# Patient Record
Sex: FEMALE | Race: BLACK OR AFRICAN AMERICAN | Employment: OTHER | URBAN - METROPOLITAN AREA
[De-identification: names, ages, dates, MRNs, and addresses within clinical notes are randomized per-mention and may not be internally consistent; named-entity substitution may affect disease eponyms.]

---

## 2022-02-08 ENCOUNTER — OFFICE VISIT (OUTPATIENT)
Dept: ORTHOPEDIC SURGERY | Age: 47
End: 2022-02-08
Payer: OTHER GOVERNMENT

## 2022-02-08 VITALS
OXYGEN SATURATION: 100 % | HEIGHT: 67 IN | WEIGHT: 176 LBS | BODY MASS INDEX: 27.62 KG/M2 | HEART RATE: 74 BPM | TEMPERATURE: 97.8 F

## 2022-02-08 DIAGNOSIS — M22.2X2 PATELLOFEMORAL SYNDROME OF LEFT KNEE: ICD-10-CM

## 2022-02-08 DIAGNOSIS — S83.282A TEAR OF LATERAL MENISCUS OF LEFT KNEE, CURRENT, UNSPECIFIED TEAR TYPE, INITIAL ENCOUNTER: Primary | ICD-10-CM

## 2022-02-08 PROCEDURE — 99203 OFFICE O/P NEW LOW 30 MIN: CPT | Performed by: ORTHOPAEDIC SURGERY

## 2022-02-08 RX ORDER — SODIUM FLUORIDE 5 MG/ML
PASTE, DENTIFRICE DENTAL
COMMUNITY
Start: 2021-12-28

## 2022-02-08 RX ORDER — ROSUVASTATIN CALCIUM 20 MG/1
TABLET, COATED ORAL
COMMUNITY
Start: 2021-12-15

## 2022-02-08 RX ORDER — MELOXICAM 15 MG/1
15 TABLET ORAL DAILY
Qty: 30 TABLET | Refills: 1 | Status: SHIPPED | OUTPATIENT
Start: 2022-02-08

## 2022-02-08 RX ORDER — DICLOFENAC SODIUM 10 MG/G
GEL TOPICAL
COMMUNITY
Start: 2021-12-28

## 2022-02-08 RX ORDER — ERGOCALCIFEROL 1.25 MG/1
50000 CAPSULE ORAL
COMMUNITY

## 2022-02-08 NOTE — PROGRESS NOTES
Javier Darby  1975   Chief Complaint   Patient presents with    Knee Pain     left knee        HISTORY OF PRESENT ILLNESS  Javier Darby is a 41 y.o. female who presents today for evaluation of left knee. She rates her pain 6/10 today. Pain has been present for almost 20 years. She has been in the [employer] and has not gotten treatment in the past 10 years. She has pain with climbing stairs. She notes stiffness after staying in one position for too long. Patient describes the pain as aching and sharp that is Constant in nature. Symptoms are worse with Bending; Stretching; Straightening; Exercise and is better with  nothing. Associated symptoms include nothing. Since problem started, it: is unchanged. Pain does not wake patient up at night. Has taken no meds for the problem. Has tried following treatments: Injections:NO; Brace:NO; Therapy:YES; Cane/Crutch:NO       No Known Allergies     History reviewed. No pertinent past medical history. Social History     Socioeconomic History    Marital status:      Spouse name: Not on file    Number of children: Not on file    Years of education: Not on file    Highest education level: Not on file   Occupational History    Not on file   Tobacco Use    Smoking status: Never Smoker    Smokeless tobacco: Never Used   Substance and Sexual Activity    Alcohol use: Not on file    Drug use: Not on file    Sexual activity: Not on file   Other Topics Concern    Not on file   Social History Narrative    Not on file     Social Determinants of Health     Financial Resource Strain:     Difficulty of Paying Living Expenses: Not on file   Food Insecurity:     Worried About Running Out of Food in the Last Year: Not on file    [place] of Food in the Last Year: Not on file   Transportation Needs:     Lack of Transportation (Medical): Not on file    Lack of Transportation (Non-Medical):  Not on file   Physical Activity:     Days of Exercise per Week: Not on file   [place] of Exercise per Session: Not on file   Stress:     Feeling of Stress : Not on file   Social Connections:     Frequency of Communication with Friends and Family: Not on file    Frequency of Social Gatherings with Friends and Family: Not on file    Attends [employer] Services: Not on file    Active Member of [address] [place] [address] [employer] or Organizations: Not on file    Attends Club or Organization Meetings: Not on file    Marital Status: Not on file   Intimate Partner Violence:     Fear of Current or Ex-Partner: Not on file    Emotionally Abused: Not on file    Physically Abused: Not on file    Sexually Abused: Not on file   Housing Stability:     Unable to Pay for Housing in the Last Year: Not on file    Number of [place] in the Last Year: Not on file    Unstable Housing in the Last Year: Not on file      Past Surgical History:   Procedure Laterality Date    HX  SECTION      HX HYSTERECTOMY        History reviewed. No pertinent family history. Current Outpatient Medications   Medication Sig    rosuvastatin (CRESTOR) 20 mg tablet TAKE ONE-HALF TABLET BY MOUTH EVERY NIGHT AT BEDTIME -FOR CHOLESTEROL    PreviDent 5000 Plus 1.1 % crea     ergocalciferol (ERGOCALCIFEROL) 1,250 mcg (50,000 unit) capsule Take 50,000 Units by mouth.  diclofenac (VOLTAREN) 1 % gel     meloxicam (MOBIC) 15 mg tablet Take 1 Tablet by mouth daily. No current facility-administered medications for this visit. REVIEW OF SYSTEM   Patient denies: Weight loss, Fever/Chills, HA, Visual changes, Fatigue, Chest pain, SOB, Abdominal pain, N/V/D/C, Blood in stool or urine, Edema. Pertinent positive as above in HPI. All others were negative    PHYSICAL EXAM:   Visit Vitals  Pulse 74   Temp 97.8 °F (36.6 °C) (Temporal)   Ht 5' 7\" (1.702 m)   Wt 176 lb (79.8 kg)   SpO2 100%   BMI 27.57 kg/m²     The patient is a well-developed, well-nourished female   in no acute distress.   The patient is alert and oriented times three.  The patient is alert and oriented times three. Mood and affect are normal.  LYMPHATIC: lymph nodes are not enlarged and are within normal limits  SKIN: normal in color and non tender to palpation. There are no bruises or abrasions noted. NEUROLOGICAL: Motor sensory exam is within normal limits. Reflexes are equal bilaterally. There is normal sensation to pinprick and light touch  MUSCULOSKELETAL:  Examination Left knee   Skin Intact   Range of motion 0-130   Effusion -   Medial joint line tenderness +   Lateral joint line tenderness -   Tenderness Pes Bursa -   Tenderness insertion MCL -   Tenderness insertion LCL -   Shiras -   Patella crepitus -   Patella grind -   Lachman -   Pivot shift -   Anterior drawer -   Posterior drawer -   Varus stress -   Valgus stress -   Neurovascular Intact   Calf Swelling and Tenderness to Palpation -   Tara's Test -   Hamstring Cord Tightness -        PROCEDURE: none    IMAGING: MRI of left knee dated 1/17/2022 was reviewed and read by Dr. Tatum Ke:   1. Small free edge radial tear involving the midportion of the midbody of the lateral meniscus   2. Very mild edema in the fat interposed between the intact iliotibial band and the lateral femoral condyle can be seen with mild distal iliotibial band friction syndrome. IMPRESSION:      ICD-10-CM ICD-9-CM    1. Tear of lateral meniscus of left knee, current, unspecified tear type, initial encounter  S83.282A 836.1 REFERRAL TO PHYSICAL THERAPY      meloxicam (MOBIC) 15 mg tablet   2. Patellofemoral syndrome of left knee  M22.2X2 719.46 REFERRAL TO PHYSICAL THERAPY      meloxicam (MOBIC) 15 mg tablet        PLAN:  1. Patient presents today with a left knee lateral meniscus tear and patellofemoral joint syndrome. We will proceed with PT and mobic to help with inflammation. Return in 5 weeks. Risk factors include: n/a  2. No ultrasound exam indicated today  3. No cortisone injection indicated today   4.  Yes Physical/Occupational Therapy indicated today  5. No diagnostic test indicated today:   6. No durable medical equipment indicated today  7. No referral indicated today   8. Yes medications indicated today: MOBIC  9. No Narcotic indicated today     RTC 5 weeks       Scribed by Adriano Summers [facility]) as dictated by Zainab Santos MD    I, Dr. Zainab Santos, confirm that all documentation is accurate.     Zainab Santos M.D.   [facility]

## 2022-02-14 ENCOUNTER — HOSPITAL ENCOUNTER (OUTPATIENT)
Dept: PHYSICAL THERAPY | Age: 47
Discharge: HOME | End: 2022-02-14
Payer: OTHER GOVERNMENT

## 2022-02-14 PROCEDURE — 97530 THERAPEUTIC ACTIVITIES: CPT

## 2022-02-14 PROCEDURE — 97110 THERAPEUTIC EXERCISES: CPT

## 2022-02-14 PROCEDURE — 97161 PT EVAL LOW COMPLEX 20 MIN: CPT

## 2022-02-14 NOTE — PROGRESS NOTES
PT DAILY TREATMENT NOTE/Knee Evaluation    Patient Name: Michael Major  Date:2022  : 1975  [x]  Patient  Verified  Payor: YAMINI / Plan: Samm Baldwin 76 / Product Type:  /    In time:10:04  Out time:10:49  Total Treatment Time (min): 45  Total Timed Codes (min): 23  1:1 Treatment Time ( [address] only): 23   Visit #: 1 of 16    Treatment Area: Left knee pain [M25.562]    SUBJECTIVE  Any medication changes, allergies to medications, adverse drug reactions, diagnosis change, or new procedure performed?: [x] No    [] Yes (see summary sheet for update)  Hx Present Illness: Subjective: Pt reports she started having pain [id number] while at work a chair was pushed into the back of her knees and fell and since then has had pain. 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 Reports would have intermittent pain throughout and sticking sensation since then. Reports feels like the knee would give out at any time. Reports that she is finally getting to a doctor. Reports that they did an MRI on 22 and determined torn mensicus so referred to PT. Doctor gave Meloxicam with no relief.     Associated symptoms:  denies    Pain:  _10__/10 max (in the last 48 hrs) _10__/10 min (in the last 48 hrs) __10_/10 currently at rest    Location: medial left knee     [x] Sharp    [] Dull      [] Burning     []  Aching     [x] Throbbing      [] Tingling     [] Other:       [x]  Constant                   [] Intermittent      Increases Pain: going up and down stairs, getting up from sitting, walking long distance, squatting  Decreases Pain:  nothing  Since Onset:  No change     Functional Limitations:  Stairs, sleeping, squatting to pick objects off the ground    Previous treatment:  PT 10 yrs ago for the knee, Cortizone injection    Co-morbidities: PMHx/Surgical Hx: []DM [] HTN (controlled) [x] High cholesterol (controlled) [] Cancer [x]Other back pain, hip pain, knee pain, bilateral plantar fascitis, neck pain  Prior Level of Function:  functionally independent, no AD, pain in the knee  Social/Recreation/Work: Work Hx: retired  Recreational Activities: raising two small kids-h[employer]     Diagnostic Testing:  [] Lab work [] X-rays    [] CT [x] MRI     [] Other:  Results: MRI per documentation: 1.  Small free radial edge tear involving the midportion of the midbody of the lateral mensicus; please see report for full details    Patient Goal(s): \"to be able to walk up the stairs with less pain\"    Barriers: [x] none []pain []financial []time []transportation []other  Motivation: good  Substance use:[x] none  []Alcohol []Tobacco []other:   Cognition: A & O x 3    Objective:    Pt enters gym in no apparent distress    Posture: [] Poor    [x] Fair    [] Good    Describe: right>left toe out, increased lumbar lordosis, left iliac crest higher, right dec longitudinal arch    Outcome measure: FOTO  Score=77    Movement/gait:  [] Normal     [x] Abnormal:  Toe out, wide base of support, dec ankle DF at heel strike, dec toe off    Patellar Positioning (Static)   []Left []Right Normal [x]Left [x]Right Lateral   [x]Left [x]Right Dominic Cody      []Left []Right Medial   []Left []Right [place]      Edema:   (centimeters) Right Left   2\" above mid patella     Mid patella 39.1 40.6   2\" below mid patella         Palpation: TTP:  Left: tibial tuberosity (caused pain to radiate), patellar tendon, lateral joint line, lateral femoral condyle, medial patella, medial joint line, medial femoral condyle, medial tibial condyle, increased tonicity in the quad    Patellar Mobility: []Left  []Right Hypermobile [x]Left [x]Right Hypomobile                           Hip Alignment: supine: right ASIS higher        ROM:  [] Unable to assess at this time     AROM             Knee Left Right   Extension Lacking 2  0   Flexion 142 (pain at end range and pain with returning to neutral) 141      LTR: right: 18 increases; Left: 18.5 inches     AROM                       Hip Left Right   Flexion 75 82                                        AROM      Ankle Left Right   Dorsiflexion 4 (pain in the knee) 0   Plantarflexion       Strength:   [] Unable to assess at this time    Left (0-5) Right (0-5) N/T   Knee extension 5- 5 []   Knee flexion 5 5 []   Ankle Dorsiflexion (L4) 4+ 4+ []   Ankle Plantarflexion (S1)  #heel raises   [x]   Hip flexion 5- 5- []   Hip abduction 4 4 []   Hip ADDuction   [x]   Hip ER 4- 4 []   Hip IR 4- 4 []   Hip extension 3+ 4- []   Glute Max 3+ 3+ []   Glute Med 4- 4- []              Balance: SLS: Left: more unsteady,left toe out, inc pronation, inc tibial rotation, 30\" feel it more than felt the right Right:  Right toe out, increased pronation, 30\"    Squat: Wide base of support, no lumbar flexion, anterior tibial translation, heel lift    Single leg squat: N/a    Neuro Screen [x] WNL Intact to light touch in bilateral LE.   Myotome/Dermatome/Reflexes:  Comments:    Special Test:  Knee  Left Right   Varus Stress Test neg neg   Valgus Stress Test Positive for pain on the medial knee, slight laxity neg   Lachman's     Apprehension     Shira's Positive for pain neg   Apley's test positive neg   Thessaly     Posterior Sag     Patellar Grind     Single Leg Squat     Anterior Drawer     Single leg sit to stand     Yosvany test ([employer])     Noble Compression test ([employer])       Hip Left Right   Dinesh Test     Aleyda Dumont     90-90 40 40   Leg length: supine: Right appears shorter    22 min [x]Eval                  []Re-Eval       15 min Therapeutic Exercise:  [x] See flow sheet :   Rationale: increase ROM and increase strength to improve the patients ability to perform daily activities with decreased pain and symptom levels     8 min Therapeutic Activity:  [x]  See flow sheet :   Rationale: assess ROM and assess strength  to improve the patients ability to perform daily activities with decreased pain and symptom levels              With   [x] TE   [x] TA   [] neuro   [] other: Patient Education: [x] Review HEP    [] Progressed/Changed HEP based on:   [x] positioning   [x] body mechanics   [] transfers   [] heat/ice application    [x] other:  Posture, anatomy, pelvic position, proper form with squatting       Pain Level (0-10 scale) post treatment: 10/10    Assessment/ key information:  Marcia Saldana is a 41 y.o.  yo female who presents to [facility] diagnosed with patellofemoral disorder of left knee. Patient is s/p fall in 1994 with inc pain in 2003. Pt has had intermittent pain and locking since then, but just now getting treatment. Per MRI reports pt with left lateral meniscal tear, but pt having constant sharp/throbbing pain at the medial left knee that increases with getting up from sitting, standing, laying down, going up and down stairs, and squatting that limites her ability to perform stairs, sleep, and squat to pick objects off the ground. Pt reporting nothing helps relief the pain. Pt rates pain 10/10 currently and 10/10 at worst, and 10/10 at least. Patient demonstrates decreased ROM, decreased strength, impaired posture, increased swelling, impaired balance, and decreased functional mobility tolerance. Pt is tender to palpate and has muscle hypertonicity along the left tibial tuberosity (caused pain to radiate to the medial knee), patellar tendon, lateral joint line, lateral femoral condyle, medial patella, medial joint line, medial femoral condyle, medial tibial condyle, increased tonicity in the quad. Pt also presents with impaired squatting techniques. Pt with positive special test including Shira's, Apley's compression, as well as Valgus stress test. Pt with difficult with performing posterior pelvic tilt as well as pelvic malalignment, which can contribute to symptoms with lumbo-pelvic dysfunction.  Pt signs and symptoms are consistent with diagnosis and mechanical knee pain. Pt would benefit from skilled physical therapy to address these limitations. Patient will benefit from skilled PT services to modify and progress therapeutic interventions, address functional mobility deficits, address ROM deficits, address strength deficits, analyze and address soft tissue restrictions, analyze and cue movement patterns, analyze and modify body mechanics/ergonomics, assess and modify postural abnormalities, and address imbalance/dizziness to attain remaining goals. [x]  See Plan of Care  []  See progress note/recertification  []  See Discharge Summary    Evaluation Complexity History HIGH Complexity :3+ comorbidities / personal factors will impact the outcome/ POC ; Examination HIGH Complexity : 4+ Standardized tests and measures addressing body structure, function, activity limitation and / or participation in recreation  ;Presentation MEDIUM Complexity : Evolving with changing characteristics  ; Clinical Decision Making LOW Complexity : FOTO score of  FOTO score = an established functional score where 100 = no disability  Overall Complexity Rating: LOW   Problem List: pain affecting function, decrease ROM, decrease strength, edema affecting function, impaired gait/ balance, decrease ADL/ functional abilitiies, decrease activity tolerance, decrease flexibility/ joint mobility and decrease transfer abilities     Treatment Plan may include any combination of the following: Therapeutic exercise, Therapeutic activities, Neuromuscular re-education, Physical agent/modality, Gait/balance training, Manual therapy, Patient education, Self Care training, Functional mobility training, Home safety training and Stair training  Vasopnuematic compression justification:  Per bilateral girth measures taken and listed above the edema is considered significant and having an impact on the patient's strength, balance, gait, transfers, self care and ADLs  Patient / Family readiness to learn indicated by: asking questions, trying to perform skills and interest  Persons(s) to be included in education: patient (P)  Barriers to Learning/Limitations: None  Patient Goal (s): \"to be able to walk up the stairs with less pain\"  Patient Self Reported Health Status: excellent  Rehabilitation Potential: good    Short Term Goals: To be accomplished in 2 weeks: 1. Patient will report compliance with HEP 1x/day to aid in rehabilitation program.  Status at IE: Provided pt with HEP and pt appeared to understand. Current:Same as IE     2. Patient will rate pain on greater than 8/10 so pt can perform ADL's. Status at IE:pt rates worst pain 10/10, least pain 10/10, current pain 10/10  Current: Same as IE    Long Term Goals: To be accomplished in 8 weeks: 1. Patient will increase limited Bilateral LE strength by at least . 5 grade MMT throughout so pt can perform stairs. Status at IE:   Left (0-5) Right (0-5) N/T   Knee extension 5- 5 []   Knee flexion 5 5 []   Ankle Dorsiflexion (L4) 4+ 4+ []   Ankle Plantarflexion (S1)  #heel raises   [x]   Hip flexion 5- 5- []   Hip abduction 4 4 []   Hip ADDuction   [x]   Hip ER 4- 4 []   Hip IR 4- 4 []   Hip extension 3+ 4- []   Glute Max 3+ 3+ []   Glute Med 4- 4- []     Current: Same as IE    2. Patient will report pain no greater than 6/10 throughout entire day to aid in completion of ADLs. Status at IE:see STG  Current: Same as IE    3. Patient will have pain free full knee flexion; increase limited ankle and hip ROM by 5 degrees so pt can  objects off the ground. Status at IE:            Knee Left Right   Extension Lacking 2  0   Flexion 142 (pain at end range and pain with returning to neutral) 141                    Hip Left Right   Flexion 75 82                          Ankle Left Right   Dorsiflexion 4 (pain in the knee) 0   Current: Same as IE    4. Patient will improve FOTO score to 79 points to demonstrate improvement in functional status.   Status at IE:77  Current: Same as IE    *FOTO score is an established functional score where 100 = no disability*    5. Pt will have 2 inch improvement in LTR so pt can have the mobility in the lumbar spine to perform a proper squat to be able to pick objects off the ground. Status at IE: LTR: right: 18 increases; Left: 18.5 inches   Current: Same as IE    6. Pt will perform proper squat with femurs close to parallel to the ground, minimal anterior tibial translation, weight posteriorly onto heels, no heel lift, no genu valgus, good posterior pelvic tilt. Status at IE: Wide base of support, no lumbar flexion, anterior tibial translation, heel lift  Current: Same as IE    Frequency / Duration: Patient to be seen 2 times per week for 8  weeks.     PLAN  [x]  Upgrade activities as tolerated     []  Continue plan of care  [x]  Update interventions per flow sheet       []  Discharge due to:_  []  Other:_        Roman Jay, PT, DPT, [facility] 2/14/2022  9:54 AM

## 2022-02-14 NOTE — PROGRESS NOTES
[facility] at the [address] [place] Sharyn Boop Amanda rowe, [address] [facility]  Phone: [phone]      Fax:  [phone]             Plan of Care/ Statement of Necessity for Physical Therapy Services      Patient name: Gladys Presley Start of Care: 2022   Referral source: Alejandro Stone,* : 1975    Medical Diagnosis: Left knee pain [M25.562]   Onset FEWH:1212, inc pain     Treatment Diagnosis: left knee pain   Prior Hospitalization: see medical history Provider#: [id number]   Medications: Verified on Patient summary List    Co-morbidities: PMHx/Surgical Hx: []?DM []? HTN (controlled) [x]? High cholesterol (controlled) []? Cancer [x]? Other back pain, hip pain, knee pain, bilateral plantar fascitis, neck pain  Prior Level of Function:  functionally independent, no AD, pain in the knee  Social/Recreation/Work: Work Hx: retired  Recreational Activities: raising two small kids-[employer]       The Plan of Care and following information is based on the information from the initial evaluation. Assessment/ pompa information:    Gladys Presley is a 41 y.o.  yo female who [facility] to [facility] diagnosed with patellofemoral disorder of left knee. Patient is s/p fall in  with inc pain in . Pt has had intermittent pain and locking since then, but just now getting treatment. Per MRI reports pt with left lateral meniscal tear, but pt having constant sharp/throbbing pain at the medial left knee that increases with getting up from sitting, standing, laying down, going up and down stairs, and squatting that limites her ability to perform stairs, sleep, and squat to pick objects off the ground. Pt reporting nothing helps relief the pain. Pt rates pain 10/10 currently and 10/10 at worst, and 10/10 at least. Patient demonstrates decreased ROM, decreased strength, impaired posture, increased swelling, impaired balance, and decreased functional mobility tolerance.  Pt is tender to palpate and has muscle hypertonicity along the left tibial tuberosity (caused pain to radiate to the medial knee), patellar tendon, lateral joint line, lateral femoral condyle, medial patella, medial joint line, medial femoral condyle, medial tibial condyle, increased tonicity in the quad. Pt also presents with impaired squatting techniques. Pt with positive special test including Shira's, Apley's compression, as well as Valgus stress test. Pt with difficult with performing posterior pelvic tilt as well as pelvic malalignment, which can contribute to symptoms with lumbo-pelvic dysfunction. Pt signs and symptoms are consistent with diagnosis and mechanical knee pain.  Pt would benefit from skilled physical therapy to address these limitations.     Patient will benefit from skilled PT services to modify and progress therapeutic interventions, address functional mobility deficits, address ROM deficits, address strength deficits, analyze and address soft tissue restrictions, analyze and cue movement patterns, analyze and modify body mechanics/ergonomics, assess and modify postural abnormalities, and address imbalance/dizziness to attain remaining goals.          Evaluation Complexity History HIGH Complexity :3+ comorbidities / personal factors will impact the outcome/ POC ; Examination HIGH Complexity : 4+ Standardized tests and measures addressing body structure, function, activity limitation and / or participation in recreation  ;Presentation MEDIUM Complexity : Evolving with changing characteristics  ;Clinical Decision Making LOW Complexity : FOTO score of  FOTO score = an established functional score where 100 = no disability  Overall Complexity Rating: LOW   Problem List: pain affecting function, decrease ROM, decrease strength, edema affecting function, impaired gait/ balance, decrease ADL/ functional abilitiies, decrease activity tolerance, decrease flexibility/ joint mobility and decrease transfer [address] may include any combination of the following: Therapeutic exercise, Therapeutic activities, Neuromuscular re-education, Physical agent/modality, Gait/balance training, Manual therapy, Patient education, Self Care training, Functional mobility training, Home safety training and Stair training  Vasopnuematic compression justification:  Per bilateral girth measures taken and listed above the edema is considered significant and having an impact on the patient's strength, balance, gait, transfers, self care and ADLs  Patient / Family readiness to learn indicated by: asking questions, trying to perform skills and interest  Persons(s) to be included in education: patient (P)  Barriers to Learning/Limitations: None  Patient Goal (s): \"to be able to walk up the stairs with less pain\"  Patient Self Reported Health Status: excellent  Rehabilitation Potential: good     Short Term Goals: To be accomplished in 2 weeks: 1. Patient will report compliance with HEP 1x/day to aid in rehabilitation program.  Status at IE: Provided pt with HEP and pt appeared to understand. Current:Same as IE     2. Patient will rate pain on greater than 8/10 so pt can perform ADL's. Status at IE:pt rates worst pain 10/10, least pain 10/10, current pain 10/10  Current: Same as IE     Long Term Goals: To be accomplished in 8 weeks: 1. Patient will increase limited Bilateral LE strength by at least . 5 grade MMT throughout so pt can perform stairs.   Status at IE:    Left (0-5) Right (0-5) N/T   Knee extension 5- 5 []?    Knee flexion 5 5 []?    Ankle Dorsiflexion (L4) 4+ 4+ []?    Ankle Plantarflexion (S1)  #heel raises     [x]?    Hip flexion 5- 5- []?    Hip abduction 4 4 []?    Hip ADDuction     [x]?    Hip ER 4- 4 []?    Hip IR 4- 4 []?    Hip extension 3+ 4- []?    Glute Max 3+ 3+ []?    Glute Med 4- 4- []?       Current: Same as IE     2.Patient will report pain no greater than 6/10 throughout entire day to aid in completion of ADLs. Status at IE:see STG  Current: Same as IE     3. Patient will have pain free full knee flexion; increase limited ankle and hip ROM by 5 degrees so pt can  objects off the ground. Status at IE:              Knee Left Right   Extension Lacking 2  0   Flexion 142 (pain at end range and pain with returning to neutral) 141                    Hip Left Right   Flexion 75 82                                                     Ankle Left Right   Dorsiflexion 4 (pain in the knee) 0   Current: Same as IE     4.Patient will improve FOTO score to 79 points to demonstrate improvement in functional status. Status at IE:77  Current: Same as IE     *FOTO score is an established functional score where 100 = no disability*     5.Pt will have 2 inch improvement in LTR so pt can have the mobility in the lumbar spine to perform a proper squat to be able to pick objects off the ground. Status at IE: LTR: right: 18 increases; Left: 18.5 inches   Current: Same as IE     6. Pt will perform proper squat with femurs close to parallel to the ground, minimal anterior tibial translation, weight posteriorly onto heels, no heel lift, no genu valgus, good posterior pelvic tilt. Status at IE: Wide base of support, no lumbar flexion, anterior tibial translation, heel lift  Current: Same as IE     Frequency / Duration: Patient to be seen 2 times per week for 8  weeks. Patient/ Caregiver education and instruction: Diagnosis, prognosis, self care, activity modification and exercises Posture, anatomy, pelvic position, proper form with squatting   [x]  Plan of care has been reviewed with PTA    Certification Period: n/a  Sara Thayer, PT, DPT, [facility] 2/14/2022 9:56 AM  _____________________________________________________________________  I certify that the above Therapy Services are being furnished while the patient is under my care.  I agree with the treatment plan and certify that this therapy is necessary.     Physician's Signature:____________Date:_________TIME:________                                      Santhosh Rosen,*    ** Signature, Date and Time must be completed for valid certification **    Please sign and return to [facility] at the [address], [address] [facility]       Phone: [phone]      Fax:  [phone]

## 2022-02-16 ENCOUNTER — HOSPITAL ENCOUNTER (OUTPATIENT)
Dept: PHYSICAL THERAPY | Age: 47
Discharge: HOME | End: 2022-02-16
Payer: OTHER GOVERNMENT

## 2022-02-16 PROCEDURE — 97530 THERAPEUTIC ACTIVITIES: CPT

## 2022-02-16 PROCEDURE — 97110 THERAPEUTIC EXERCISES: CPT

## 2022-02-16 PROCEDURE — 97016 VASOPNEUMATIC DEVICE THERAPY: CPT

## 2022-02-16 PROCEDURE — 97140 MANUAL THERAPY 1/> REGIONS: CPT

## 2022-02-16 PROCEDURE — 97112 NEUROMUSCULAR REEDUCATION: CPT

## 2022-02-16 NOTE — PROGRESS NOTES
PT DAILY TREATMENT NOTE    Patient Name: Fortino Delacruz  Date:2022  : 1975  [x]  Patient  Verified  Payor:  / Plan: [facility]  [facility] REGION / Product Type:  /    In time:12:47  Out time:1:49  Total Treatment Time (min): 62  Total Timed Codes (min): 52  1:1 Treatment Time (MC/BCBS only): 46   Visit #: 2 of 16    Treatment Dx: Left knee pain [M25.562]    SUBJECTIVE  Pain Level (0-10 scale): 10/10  Any medication changes, allergies to medications, adverse drug reactions, diagnosis change, or new procedure performed?: [x] No    [] Yes (see summary sheet for update)  Subjective functional status/changes:   [] No changes reported  Pt reports that she had some throbbing after the evaluation and has been that way since. Reports that she did the HEP last night.      OBJECTIVE    Modalities Rationale:     decrease edema, decrease inflammation and decrease pain to improve patient's ability to perform daily activities with decreased pain and symptom levels     min [] Estim, type/location:                                      []  att     []  unatt     []  w/US     []  w/ice    []  w/heat    min []  Mechanical Traction: type/lbs                   []  pro   []  sup   []  int   []  cont    []  before manual    []  after manual    min []  Ultrasound, settings/location:      min []  Iontophoresis w/ dexamethasone, location:                                               []  take home patch       []  in clinic    min []  Ice     []  Heat    location/position:    10 min [x]  Vasopneumatic Device, press/temp: Left knee, medium pressure, LE elevated   If using vaso (only need to measure limb vaso being performed on)      pre-treatment girth : 40. cm      post-treatment girth : 39      measured at (landmark location) :  Mid patella    min []  Other:    [] Skin assessment post-treatment (if applicable):    []  intact    []  redness- no adverse reaction                  []redness - adverse reaction: 19 min Therapeutic Exercise:  [] See flow sheet :   Rationale: increase ROM, increase strength, improve coordination, improve balance and increase proprioception to improve the patients ability to perform daily activities with decreased pain and symptom levels      10 min Therapeutic Activity:  []  See flow sheet :   Rationale: increase ROM, increase strength, improve coordination, improve balance and increase proprioception  to improve the patients ability to perform daily activities with decreased pain and symptom levels       15 min Neuromuscular Re-education:  []  See flow sheet :   Rationale: increase ROM, increase strength, improve coordination, improve balance and increase proprioception  to improve the patients ability to perform daily activities with decreased pain and symptom levels      8 min Manual Therapy:  Pt in left sidelying with bolster under left trunk, right LE extended with peanut bolster under leg, MFR superficial to Right QL with right iliac depression, STM to Right QL, manual posterior capsule stretch     Rationale: decrease pain, increase ROM, increase tissue extensibility, decrease trigger points and increase postural awareness to improve the patients ability to perform daily activities with decreased pain and symptom levels    The manual therapy interventions were performed at a separate and distinct time from the therapeutic activities interventions. With   [x] TE   [x] TA   [x] neuro   [] other: Patient Education: [x] Review HEP    [] Progressed/Changed HEP based on:   [x] positioning   [] body mechanics   [] transfers   [] heat/ice application    [x] other: provided ADL handout, educated sleeping with pillow under trunk she is laying on, causes of meniscal tears,       Other Objective/Functional Measures: Pt enters gym in no apparent distress.        Pain Level (0-10 scale) post treatment: 10/10    ASSESSMENT/Changes in Function: Patient tolerated therapy session well as there were no adverse reactions today. Pt reporting high pain rating, but able to participate with therapy. Pt had a real hard time with left adductor pull back and required nearly constant tactile cuing for proper positioning. Pt noted to have decreased mobility in the left posterior hip. Pt feeling fatigued with glut max exercise. Pt reporting having some tingling in the right foot with lady in glasses. Pt also reporting feeling right neck discomfort with manual stretch of the left posterior capsule. Pt is progressing with therapy as indicated by pt tolerating increase in exercise repetitions and resistance. Although showing progress patient would benefit from continuation of skilled physical therapy to address the remaining limitations. Patient will continue to benefit from skilled PT services to modify and progress therapeutic interventions, address functional mobility deficits, address ROM deficits, address strength deficits, analyze and address soft tissue restrictions, analyze and cue movement patterns, analyze and modify body mechanics/ergonomics, assess and modify postural abnormalities, address imbalance/dizziness and instruct in home and community integration to attain remaining goals. [x]  See Plan of Care  []  See progress note/recertification  []  See Discharge Summary         Progress towards goals / Updated goals:  Short Term Goals:   To be accomplished in 2 weeks: 1. Patient will report compliance with HEP 1x/day to aid in rehabilitation program.  Status at IE: Provided pt with HEP and pt appeared to understand. Current:2/16/22 reviewed, provided ADL positioning handout     2. Patient will rate pain on greater than 8/10 so pt can perform ADL's. Status at IE:pt rates worst pain 10/10, least pain 10/10, current pain 10/10  Current: 2/16/22 rates pain 10/10 entering therapy, 10/10 post therapy     Long Term Goals: To be accomplished in 8 weeks: 1. Patient will increase limited Bilateral LE strength by at least . 5 grade MMT throughout so pt can perform stairs. Status at IE:    Left (0-5) Right (0-5) N/T   Knee extension 5- 5 []? ?    Knee flexion 5 5 []? ?    Ankle Dorsiflexion (L4) 4+ 4+ []? ?    Ankle Plantarflexion (S1)  #heel raises     [x]? ?    Hip flexion 5- 5- []??    Hip abduction 4 4 []? ?    Hip ADDuction     [x]? ?    Hip ER 4- 4 []? ?    Hip IR 4- 4 []? ?    Hip extension 3+ 4- []??    Glute Max 3+ 3+ []? ?    Glute Med 4- 4- []??       Current: Same as IE     2.Patient will report pain no greater than 6/10 throughout entire day to aid in completion of ADLs. Status at IE:see STG  Current: Same as IE     3. Patient will have pain free full knee flexion; increase limited ankle and hip ROM by 5 degrees so pt can  objects off the ground. Status at IE:              Knee Left Right   Extension Lacking 2  0   Flexion 142 (pain at end range and pain with returning to neutral) 141                    Hip Left Right   Flexion 75 82                                                     Ankle Left Right   Dorsiflexion 4 (pain in the knee) 0   Current: Same as IE     4.Patient will improve FOTO score to 79 points to demonstrate improvement in functional status. Status at IE:77  Current: Same as IE     *FOTO score is an established functional score where 100 = no disability*     5.Pt will have 2 inch improvement in LTR so pt can have the mobility in the lumbar spine to perform a proper squat to be able to pick objects off the ground. Status at IE: LTR: right: 18 increases; Left: 18.5 inches   Current: Same as IE     6. Pt will perform proper squat with femurs close to parallel to the ground, minimal anterior tibial translation, weight posteriorly onto heels, no heel lift, no genu valgus, good posterior pelvic tilt.   Status at IE: Wide base of support, no lumbar flexion, anterior tibial translation, heel lift  Current: Same as IE         PLAN  [x]  Upgrade activities as tolerated     [x] Continue plan of care  []  Update interventions per flow sheet       []  Discharge due to:_  []  Other:_      Luís Santana, PT, DPT, [facility] 2/16/2022  12:51 PM    Future Appointments   Date Time Provider Amanda Al   2/22/2022  2:15 PM Tanya Rankin [facility]   2/25/2022 12:15 PM Vaishali Roa PT [facility]   3/2/2022 12:45 PM Sher Celeste, PT [facility]   3/4/2022 12:45 PM Sher Celeste, PT [facility]   3/8/2022 12:45 PM Sher Celeste, PT [facility]   3/11/2022 12:45 PM Sher Celeste, PT [facility]

## 2022-02-22 ENCOUNTER — HOSPITAL ENCOUNTER (OUTPATIENT)
Dept: PHYSICAL THERAPY | Age: 47
Discharge: HOME | End: 2022-02-22
Payer: OTHER GOVERNMENT

## 2022-02-22 PROCEDURE — 97530 THERAPEUTIC ACTIVITIES: CPT

## 2022-02-22 PROCEDURE — 97110 THERAPEUTIC EXERCISES: CPT

## 2022-02-22 PROCEDURE — 97112 NEUROMUSCULAR REEDUCATION: CPT

## 2022-02-22 NOTE — PROGRESS NOTES
PT DAILY TREATMENT NOTE    Patient Name: Heyward Nyhan  Date:2022  : 1975  [x]  Patient  Verified  Payor:  [facility]  [facility] REGION / Product Type: Chantell Satinder /    In time:2:20  Out time:3:05  Total Treatment Time (min): 45  Total Timed Codes (min): 45  1:1 Treatment Time (MC/BCBS only): 45   Visit #: 3 of 16    Treatment Dx: Left knee pain [M25.562]    SUBJECTIVE  Pain Level (0-10 scale):  10  Any medication changes, allergies to medications, adverse drug reactions, diagnosis change, or new procedure performed?: [x] No    [] Yes (see summary sheet for update)  Subjective functional status/changes:   [] No changes reported  \"No change. \"    OBJECTIVE      10 min Therapeutic Exercise:  [x] See flow sheet :   Rationale: increase ROM and increase strength to improve the patients ability to perform daily activities with decreased pain and symptom levels    10 min Therapeutic Activity:  [x]  See flow sheet :   Rationale: increase strength, improve coordination and increase proprioception  to improve the patients ability to perform daily activities with decreased pain and symptom levels     25 min Neuromuscular Re-education:  [x]  See flow sheet :   Rationale: increase strength, improve coordination, improve balance and increase proprioception  to improve the patients ability to perform daily activities with decreased pain and symptom levels       With   [] TE   [] TA   [] neuro   [] other: Patient Education: [x] Review HEP    [] Progressed/Changed HEP based on:   [] positioning   [] body mechanics   [] transfers   [] heat/ice application    [] other:      Other Objective/Functional Measures:   WILLIAN Special Tests (pre/post)    Hip Add Drop Test:                           Right: + not midline/midline            Left:+ not midline/midline  Trunk Rot                                           Right: 18in/16in    Left 17in /16in   + SLR bilaterally left moves more than right  Hip extension positive bilaterally   Patella tracking laterally  Left HS fatigue with 90/90 hip shift  Max assist manually and with props to complete add pull back       Pain Level (0-10 scale) post treatment: 10    ASSESSMENT/Changes in Function: Pt tolerated session well with reporting no change in pain post session. Pt reporting still constant 10/10 pain with no relief in medial aspect of knee and reporting knee \"popping\" over weekend. Pt continues to demonstrate lumbopelvic dysfunction with noting lumbar and lateral quad compensation with exercises. Pt very challenged with PPT form however improved with completing QP first. Able to recruit left HS with 90/90 hip shift with minimal cues. Pt needing max assist with PPT to decreased lumbar compensation with left add pull back. Good response to standing post hip stretch and long sit reach with updating HEP today. Patient will continue to benefit from skilled PT services to modify and progress therapeutic interventions, address functional mobility deficits, address ROM deficits, address strength deficits, analyze and address soft tissue restrictions, analyze and cue movement patterns, analyze and modify body mechanics/ergonomics, assess and modify postural abnormalities, address imbalance/dizziness and instruct in home and community integration to attain remaining goals. [x]  See Plan of Care  []  See progress note/recertification  []  See Discharge Summary         Progress towards goals / Updated goals:  Short Term Goals:   To be accomplished in 2 weeks: 1. Patient will report compliance with HEP 1x/day to aid in rehabilitation program.  Status at IE: Provided pt with HEP and pt appeared to understand. Current:compliance, overview and updated today progressing 2/22/22     2. Patient will rate pain on greater than 8/10 so pt can perform ADL's.   Status at IE:pt rates worst pain 10/10, least pain 10/10, current pain 10/10  Current: 2/16/22 rates pain 10/10 entering therapy, 10/10 post therapy     Long Term Goals: To be accomplished in 8 weeks: 1. Patient will increase limited Bilateral LE strength by at least . 5 grade MMT throughout so pt can perform stairs. Status at IE:    Left (0-5) Right (0-5) N/T   Knee extension 5- 5 []? ??    Knee flexion 5 5 []? ??    Ankle Dorsiflexion (L4) 4+ 4+ []???    Ankle Plantarflexion (S1)  #heel raises     [x]? ??    Hip flexion 5- 5- []???    Hip abduction 4 4 []? ??    Hip ADDuction     [x]? ??    Hip ER 4- 4 []? ??    Hip IR 4- 4 []? ??    Hip extension 3+ 4- []???    Glute Max 3+ 3+ []???    Glute Med 4- 4- []???       Current: Same as IE     2.Patient will report pain no greater than 6/10 throughout entire day to aid in completion of ADLs. Status at IE:see STG  Current: Same as IE     3. Patient will have pain free full knee flexion; increase limited ankle and hip ROM by 5 degrees so pt can  objects off the ground. Status at IE:              Knee Left Right   Extension Lacking 2  0   Flexion 142 (pain at end range and pain with returning to neutral) 141                    Hip Left Right   Flexion 75 82                                                     Ankle Left Right   Dorsiflexion 4 (pain in the knee) 0   Current: Same as IE     4.Patient will improve FOTO score to 79 points to demonstrate improvement in functional status. Status at IE:77  Current: Same as IE     *FOTO score is an established functional score where 100 = no disability*     5.Pt will have 2 inch improvement in LTR so pt can have the mobility in the lumbar spine to perform a proper squat to be able to pick objects off the ground. Status at IE: LTR: right: 18 increases; Left: 18.5 inches   Current: 16in post session progressing 2/22/22     6. Pt will perform proper squat with femurs close to parallel to the ground, minimal anterior tibial translation, weight posteriorly onto heels, no heel lift, no genu valgus, good posterior pelvic tilt.   Status at IE: Wide base of support, no lumbar flexion, anterior tibial translation, heel lift  Current: Same as IE         PLAN  []  Upgrade activities as tolerated     [x]  Continue plan of care  []  Update interventions per flow sheet       []  Discharge due to:_  []  Other:_      Lennymolly Rankin 2/22/2022  1:27 PM    Future Appointments   Date Time Provider Amanda Al   2/22/2022  2:15 PM Lenny Rankin [facility]   2/25/2022 12:15 PM Palmer Hartmann, PT [facility]   3/2/2022 12:45 PM Gómez David, PT [facility]   3/4/2022 12:45 PM Gómez Joann, PT [facility]   3/8/2022 12:45 PM Gómez David, PT [facility]   3/11/2022 12:45 PM Gómez Joann, PT [facility]

## 2022-02-25 ENCOUNTER — APPOINTMENT (OUTPATIENT)
Dept: PHYSICAL THERAPY | Age: 47
End: 2022-02-25
Payer: OTHER GOVERNMENT

## 2022-03-02 ENCOUNTER — HOSPITAL ENCOUNTER (OUTPATIENT)
Dept: PHYSICAL THERAPY | Age: 47
Discharge: HOME | End: 2022-03-02
Payer: OTHER GOVERNMENT

## 2022-03-02 PROCEDURE — 97110 THERAPEUTIC EXERCISES: CPT

## 2022-03-02 PROCEDURE — 97530 THERAPEUTIC ACTIVITIES: CPT

## 2022-03-02 PROCEDURE — 97112 NEUROMUSCULAR REEDUCATION: CPT

## 2022-03-02 NOTE — PROGRESS NOTES
PT DAILY TREATMENT NOTE    Patient Name: Indu Juan  Date:3/2/2022  : 1975  [x]  Patient  Verified  Payor:  / Plan: [facility]  [facility], [place] [facility] / Product Type:  /    In time:12:46  Out time:1:41  Total Treatment Time (min): 55  Total Timed Codes (min): 55  1:1 Treatment Time (MC/BCBS only): 54   Visit #: 4 of 16    Treatment Dx: Left knee pain [M25.562]    SUBJECTIVE  Pain Level (0-10 scale): 8/10  Any medication changes, allergies to medications, adverse drug reactions, diagnosis change, or new procedure performed?: [x] No    [] Yes (see summary sheet for update)  Subjective functional status/changes:   [] No changes reported  Pt reports that she felt it after last therapy session. Reports that she had to miss her appointment because of a mammogram. Reports that she is doing the HEP, not sure if she is doing them right. OBJECTIVE      15 min Therapeutic Exercise:  [] See flow sheet :   Rationale: increase ROM, increase strength, improve coordination, improve balance and increase proprioception to improve the patients ability to perform daily activities with decreased pain and symptom levels      15 min Therapeutic Activity:  []  See flow sheet :   Rationale: increase ROM, increase strength, improve coordination, improve balance and increase proprioception  to improve the patients ability to perform daily activities with decreased pain and symptom levels       25 min Neuromuscular Re-education:  []  See flow sheet : KT taping to the left knee for medial patellar positioning.     Rationale: increase ROM, increase strength, improve coordination, improve balance and increase proprioception  to improve the patients ability to perform daily activities with decreased pain and symptom levels            With   [x] TE   [x] TA   [x] neuro   [] other: Patient Education: [x] Review HEP    [] Progressed/Changed HEP based on:   [x] positioning   [] body mechanics   [] transfers   [] heat/ice application    [] other:      Other Objective/Functional Measures: Pt enters gym in no apparent distress. Pretest/Post  Adductor Drop Test: right: positive    left: positive  Lower trunk rotation: Right: 18 inches/16.5 inches left:17.5/16 inches   Hruska ADDuctor lift test: Right: 1 Left: 1  Hruska ABDuctor lift test: Right: 2  Left: 1        Pain Level (0-10 scale) post treatment: 7/10    ASSESSMENT/Changes in Function:  Patient tolerated therapy session well as there were no adverse reactions today. Pt noted to have decreased measurements/testing prior to proper hip placement. Pt feeling fatigued in the left hamstring and left adductor with hip shift. Pt has a hard time performing standing left posterior capsule stretch even with max tactile cuing. Pt with a tendency to shift weight anteriorly onto toes. Added left TFL inhibition to program.  Pt required max tactile cuing with sidelying adductor pull back. Pt is progressing with therapy as indicated by pt tolerating increase in exercise repetitions and resistance. Although showing progress patient would benefit from continuation of skilled physical therapy to address the remaining limitations. Patient will continue to benefit from skilled PT services to modify and progress therapeutic interventions, address functional mobility deficits, address ROM deficits, address strength deficits, analyze and address soft tissue restrictions, analyze and cue movement patterns, analyze and modify body mechanics/ergonomics, assess and modify postural abnormalities and instruct in home and community integration to attain remaining goals. [x]  See Plan of Care  []  See progress note/recertification  []  See Discharge Summary         Progress towards goals / Updated goals:  Short Term Goals:   To be accomplished in 2 weeks: 1. Patient will report compliance with HEP 1x/day to aid in rehabilitation program.  Status at IE: Provided pt with HEP and pt appeared to understand. Current:pt reports compliance, review progressing 3/2/22     2. Patient will rate pain on greater than 8/10 so pt can perform ADL's. Status at IE:pt rates worst pain 10/10, least pain 10/10, current pain 10/10  Current: 3/2/22 rates pain 8/10 entering therapy, 7/10 post therapy     Long Term Goals: To be accomplished in 8 weeks: 1. Patient will increase limited Bilateral LE strength by at least . 5 grade MMT throughout so pt can perform stairs. Status at IE:    Left (0-5) Right (0-5) N/T   Knee extension 5- 5 []????    Knee flexion 5 5 []????    Ankle Dorsiflexion (L4) 4+ 4+ []????    Ankle Plantarflexion (S1)  #heel raises     [x]? ???    Hip flexion 5- 5- []????    Hip abduction 4 4 []????    Hip ADDuction     [x]? ???    Hip ER 4- 4 []????    Hip IR 4- 4 []????    Hip extension 3+ 4- []????    Glute Max 3+ 3+ []????    Glute Med 4- 4- []????       Current: Same as IE     2.Patient will report pain no greater than 6/10 throughout entire day to aid in completion of ADLs. Status at IE:see STG  Current: Same as IE     3. Patient will have pain free full knee flexion; increase limited ankle and hip ROM by 5 degrees so pt can  objects off the ground. Status at IE:              Knee Left Right   Extension Lacking 2  0   Flexion 142 (pain at end range and pain with returning to neutral) 141                    Hip Left Right   Flexion 75 82                                                     Ankle Left Right   Dorsiflexion 4 (pain in the knee) 0   Current: Same as IE     4.Patient will improve FOTO score to 79 points to demonstrate improvement in functional status. Status at IE:77  Current:3/2/22 will perform on 5th visit, next visit     *FOTO score is an established functional score where 100 = no disability*     5.Pt will have 2 inch improvement in LTR so pt can have the mobility in the lumbar spine to perform a proper squat to be able to pick objects off the ground.   Status at IE: LTR: right: 18 increases; Left: 18.5 inches   Current: 3/2/22 Lower trunk rotation: Right: 18 inches/16.5 inches left:17.5/16 inches -progressing       6. Pt will perform proper squat with femurs close to parallel to the ground, minimal anterior tibial translation, weight posteriorly onto heels, no heel lift, no genu valgus, good posterior pelvic tilt.   Status at IE: Wide base of support, no lumbar flexion, anterior tibial translation, heel lift  Current: Same as IE         PLAN  [x]  Upgrade activities as tolerated     [x]  Continue plan of care  []  Update interventions per flow sheet       []  Discharge due to:_  []  Other:_      Ko Carey, PT, DPT, [facility] 3/2/2022  12:12 PM    Future Appointments   Date Time Provider Amanda Al   3/2/2022 12:45 PM Cristina Montesinos, PT [facility]   3/4/2022 12:45 PM Cristina Montesinos, PT [facility]   3/8/2022 12:45 PM Cristina Montesinos, PT [facility]   3/11/2022 12:45 PM Cristina Montesinos, PT [facility]   3/15/2022  2:15 PM Windy Gray PA [facility]

## 2022-03-04 ENCOUNTER — HOSPITAL ENCOUNTER (OUTPATIENT)
Dept: PHYSICAL THERAPY | Age: 47
Discharge: HOME | End: 2022-03-04
Payer: OTHER GOVERNMENT

## 2022-03-04 PROCEDURE — 97110 THERAPEUTIC EXERCISES: CPT

## 2022-03-04 PROCEDURE — 97530 THERAPEUTIC ACTIVITIES: CPT

## 2022-03-04 PROCEDURE — 97112 NEUROMUSCULAR REEDUCATION: CPT

## 2022-03-04 NOTE — PROGRESS NOTES
PT DAILY TREATMENT NOTE    Patient Name: Sloan Villalba  Date:3/4/2022  : 1975  [x]  Patient  Verified  Payor: [employer] / Plan: [facility] / Product Type:  /    In time:12:48  Out time:1:38  Total Treatment Time (min): 50  Total Timed Codes (min): 50  1:1 Treatment Time (MC/BCBS only): 50   Visit #: 5 of 16    Treatment Dx: Left knee pain [M25.562]    SUBJECTIVE  Pain Level (0-10 scale): 6/10  Any medication changes, allergies to medications, adverse drug reactions, diagnosis change, or new procedure performed?: [x] No    [] Yes (see summary sheet for update)  Subjective functional status/changes:   [] No changes reported  Pt reports that the taping really helped and she is feeling pretty good. OBJECTIVE        15 min Therapeutic Exercise:  [] See flow sheet :   Rationale: increase ROM, increase strength, improve coordination, improve balance and increase proprioception to improve the patients ability to perform daily activities with decreased pain and symptom levels      10 min Therapeutic Activity:  []  See flow sheet :   Rationale: increase ROM, increase strength, improve coordination, improve balance and increase proprioception  to improve the patients ability to perform daily activities with decreased pain and symptom levels       25 min Neuromuscular Re-education:  []  See flow sheet : KT taping to the left knee for medial patellar positioning.     Rationale: increase ROM, increase strength, improve coordination, improve balance and increase proprioception  to improve the patients ability to perform daily activities with decreased pain and symptom levels               With   [x] TE   [x] TA   [x] neuro   [] other: Patient Education: [x] Review HEP    [] Progressed/Changed HEP based on:   [x] positioning   [x] body mechanics   [] transfers   [] heat/ice application    [] other:      Other Objective/Functional Measures: Pt enters gym in no apparent distress. Pretest  Adductor Drop Test: right:  negative   left:   Lower trunk rotation: Right: 17 inches left: 16.5 inches    Pain Level (0-10 scale) post treatment: 5/10    ASSESSMENT/Changes in Function: Patient tolerated therapy session well as there were no adverse reactions today. Pt finding relief with taping so performed again this therapy session. Pt was able to get some motion with adductor pull back, but continue to require max tactile cuing. Pt required tactile cuing to maintain full PPT with exercises. Pt was feeling left hip adductors much with adductor pull back so added left s/l knee to knee to POC, and pt did feel fatigued in the left adductors. Pt is progressing with therapy as indicated by pt tolerating increase in exercise repetitions and resistance. Although showing progress patient would benefit from continuation of skilled physical therapy to address the remaining limitations. Patient will continue to benefit from skilled PT services to modify and progress therapeutic interventions, address functional mobility deficits, address ROM deficits, address strength deficits, analyze and address soft tissue restrictions, analyze and cue movement patterns, analyze and modify body mechanics/ergonomics, assess and modify postural abnormalities and instruct in home and community integration to attain remaining goals. [x]  See Plan of Care  []  See progress note/recertification  []  See Discharge Summary         Progress towards goals / Updated goals:  Short Term Goals:   To be accomplished in 2 weeks: 1. Patient will report compliance with HEP 1x/day to aid in rehabilitation program.  Status at IE: Provided pt with HEP and pt appeared to understand. Current:pt reports compliance, reviewed, updated per chart progressing 3/4/22     2. Patient will rate pain on greater than 8/10 so pt can perform ADL's.   Status at IE:pt rates worst pain 10/10, least pain 10/10, current pain 10/10  Current: 3/4/22 rates pain 6/10 entering therapy, 5/10 post therapy     Long Term Goals: To be accomplished in 8 weeks: 1. Patient will increase limited Bilateral LE strength by at least . 5 grade MMT throughout so pt can perform stairs. Status at IE:    Left (0-5) Right (0-5) N/T   Knee extension 5- 5 []?????    Knee flexion 5 5 []?????    Ankle Dorsiflexion (L4) 4+ 4+ []?????    Ankle Plantarflexion (S1)  #heel raises     [x]?????    Hip flexion 5- 5- []?????    Hip abduction 4 4 []?????    Hip ADDuction     [x]?????    Hip ER 4- 4 []?????    Hip IR 4- 4 []?????    Hip extension 3+ 4- []?????    Glute Max 3+ 3+ []?????    Glute Med 4- 4- []?????      Current: 3/4/22 pt feeling fatigued with left glut med-progressing     2. Patient will report pain no greater than 6/10 throughout entire day to aid in completion of ADLs. Status at IE:see STG  Current: Same as IE     3. Patient will have pain free full knee flexion; increase limited ankle and hip ROM by 5 degrees so pt can  objects off the ground. Status at IE:              Knee Left Right   Extension Lacking 2  0   Flexion 142 (pain at end range and pain with returning to neutral) 141                    Hip Left Right   Flexion 75 82                                                     Ankle Left Right   Dorsiflexion 4 (pain in the knee) 0   Current: Same as IE     4.Patient will improve FOTO score to 79 points to demonstrate improvement in functional status. Status at IE:77  Current:3/4/22 will perform next visit     *FOTO score is an established functional score where 100 = no disability*     5.Pt will have 2 inch improvement in LTR so pt can have the mobility in the lumbar spine to perform a proper squat to be able to pick objects off the ground. Status at IE: LTR: right: 18 increases; Left: 18.5 inches   Current: 3/4/22 pre-exercise: Lower trunk rotation: Right: 17 inches left: 16.5 inches (didn't measure post)-progressing        6.  Pt will perform proper squat with femurs close to parallel to the ground, minimal anterior tibial translation, weight posteriorly onto heels, no heel lift, no genu valgus, good posterior pelvic tilt.   Status at IE: Wide base of support, no lumbar flexion, anterior tibial translation, heel lift  Current: Same as IE         PLAN  []  Upgrade activities as tolerated     [x]  Continue plan of care  []  Update interventions per flow sheet       []  Discharge due to:_  []  Other:_      Aditya Glover, PT, DPT, [facility] 3/4/2022  12:26 PM    Future Appointments   Date Time Provider Amanda Al   3/4/2022 12:45 PM Irl Kentwood, PT [facility]   3/8/2022 12:45 PM Irl Kalie, PT [facility]   3/11/2022 12:45 PM Irl Kentwood, PT [facility]   3/15/2022  2:15 PM Jayesh Gary PA [facility]

## 2022-03-08 ENCOUNTER — HOSPITAL ENCOUNTER (OUTPATIENT)
Dept: PHYSICAL THERAPY | Age: 47
Discharge: HOME | End: 2022-03-08
Payer: OTHER GOVERNMENT

## 2022-03-08 PROCEDURE — 97112 NEUROMUSCULAR REEDUCATION: CPT

## 2022-03-08 PROCEDURE — 97110 THERAPEUTIC EXERCISES: CPT

## 2022-03-08 PROCEDURE — 97530 THERAPEUTIC ACTIVITIES: CPT

## 2022-03-08 NOTE — PROGRESS NOTES
PT DAILY TREATMENT NOTE    Patient Name: Juliane Hernandez  Date:3/8/2022  : 1975  [x]  Patient  Verified  Payor:  / Plan: [facility] / Product Type:  /    In time:12:49  Out time:1:41  Total Treatment Time (min): 52  Total Timed Codes (min): 52  1:1 Treatment Time (MC/BCBS only): 46   Visit #: 6 of 16    Treatment Dx: Left knee pain [M25.562]    SUBJECTIVE  Pain Level (0-10 scale): 5  Any medication changes, allergies to medications, adverse drug reactions, diagnosis change, or new procedure performed?: [x] No    [] Yes (see summary sheet for update)  Subjective functional status/changes:   [] No changes reported  Pt reports that she initially answered the FOTO with no pain on gain, not realizing and taking the time to think about if she has pain. Reports that she is walking up the stairs more with the left LE.      OBJECTIVE    17 min Therapeutic Exercise:  [] See flow sheet :   Rationale: increase ROM, increase strength, improve coordination, improve balance and increase proprioception to improve the patients ability to perform daily activities with decreased pain and symptom levels      10 min Therapeutic Activity:  []  See flow sheet :   Rationale: increase ROM, increase strength, improve coordination, improve balance and increase proprioception  to improve the patients ability to perform daily activities with decreased pain and symptom levels       25 min Neuromuscular Re-education:  []  See flow sheet : KT taping for medial patellar glide   Rationale: increase ROM, increase strength, improve coordination, improve balance and increase proprioception  to improve the patients ability to perform daily activities with decreased pain and symptom levels              With   [x] TE   [x] TA   [x] neuro   [] other: Patient Education: [x] Review HEP    [] Progressed/Changed HEP based on:   [x] positioning   [] body mechanics   [] transfers   [] heat/ice application    [] other: Other Objective/Functional Measures: Pt enters gym in no apparent distress. Pretest/Post  Adductor Drop Test: right: positive     left: positive  Hip extensor drop test: right: left:   FA IR: Right: 30 Left: 22   FA ER: right:30  left: 30  Lower trunk rotation: Right: 18/18 inches  left: 19/16.5  Hruska ADDuctor lift test: Right:2 Left: 2  Hruska ABDuctor lift test: Right: 2 Left: 2          Pain Level (0-10 scale) post treatment: 5/10    ASSESSMENT/Changes in Function: Patient tolerated therapy session well as there were no adverse reactions today. Pt initially not feeling the left adductor with 90-90 hip shift so placed the left hip in more hip IR and pt felt it more (adding medial hamstring). Pt feeling some calf discomfort with 90-90 gwendolyn from possible compensation. Pt was able to perform adductor pull back with less tactile cuing but still required verbal cuing. Pt may benefit from manual left posterior capsule stretch. Pt is progressing with therapy as indicated by pt tolerating increase in exercise repetitions and resistance. Although showing progress patient would benefit from continuation of skilled physical therapy to address the remaining limitations. Patient will continue to benefit from skilled PT services to modify and progress therapeutic interventions, address functional mobility deficits, address ROM deficits, address strength deficits, analyze and address soft tissue restrictions, analyze and cue movement patterns, analyze and modify body mechanics/ergonomics, assess and modify postural abnormalities, address imbalance/dizziness and instruct in home and community integration to attain remaining goals. [x]  See Plan of Care  []  See progress note/recertification  []  See Discharge Summary         Progress towards goals / Updated goals:  Short Term Goals:   To be accomplished in 2 weeks: 1. Patient will report compliance with HEP 1x/day to aid in rehabilitation program.  Status at IE: Provided pt with HEP and pt appeared to understand. Current:pt reports compliance, reviewed, updated per chart progressing 3/4/22     2. Patient will rate pain on greater than 8/10 so pt can perform ADL's. Status at IE:pt rates worst pain 10/10, least pain 10/10, current pain 10/10  Current: 3/8/22 rates pain 5/10 entering therapy, 5/10 post therapy     Long Term Goals: To be accomplished in 8 weeks: 1. Patient will increase limited Bilateral LE strength by at least . 5 grade MMT throughout so pt can perform stairs. Status at IE:    Left (0-5) Right (0-5) N/T   Knee extension 5- 5 []??????    Knee flexion 5 5 []??????    Ankle Dorsiflexion (L4) 4+ 4+ []??????    Ankle Plantarflexion (S1)  #heel raises     [x]??????    Hip flexion 5- 5- []??????    Hip abduction 4 4 []??????    Hip ADDuction     [x]??????    Hip ER 4- 4 []??????    Hip IR 4- 4 []??????    Hip extension 3+ 4- []??????    Glute Max 3+ 3+ []??????    Glute Med 4- 4- []??????       Current: 3/4/22 pt feeling fatigued with left glut med-progressing     2. Patient will report pain no greater than 6/10 throughout entire day to aid in completion of ADLs. Status at IE:see STG  Current: Same as IE     3. Patient will have pain free full knee flexion; increase limited ankle and hip ROM by 5 degrees so pt can  objects off the ground. Status at IE:              Knee Left Right   Extension Lacking 2  0   Flexion 142 (pain at end range and pain with returning to neutral) 141                    Hip Left Right   Flexion 75 82                                                     Ankle Left Right   Dorsiflexion 4 (pain in the knee) 0   Current: Same as IE     4.Patient will improve FOTO score to 79 points to demonstrate improvement in functional status.   Status at IE:77  Current:3/8/22 70 regression, but she answered initially with the mind set of \"no pain no gain\"     *FOTO score is an established functional score where 100 = no disability*     5.Pt will have 2 inch improvement in LTR so pt can have the mobility in the lumbar spine to perform a proper squat to be able to pick objects off the ground. Status at IE: LTR: right: 18 increases; Left: 18.5 inches   Current: 3/8/22 Lower trunk rotation: Right: 18/18 inches  left: 19/16. 5-progressing        6. Pt will perform proper squat with femurs close to parallel to the ground, minimal anterior tibial translation, weight posteriorly onto heels, no heel lift, no genu valgus, good posterior pelvic tilt.   Status at IE: Wide base of support, no lumbar flexion, anterior tibial translation, heel lift  Current: Same as IE         PLAN  []  Upgrade activities as tolerated     [x]  Continue plan of care  []  Update interventions per flow sheet       []  Discharge due to:_  []  Other:_      Shelli Jamil, PT, DPT, [facility] 3/8/2022  12:44 PM    Future Appointments   Date Time Provider Amanda Ratliffi   3/8/2022 12:45 PM JASON King [facility]   3/11/2022 12:45 PM JASON King [facility]   3/16/2022 11:15 AM Johnathan Maciel, JAVIER Roe [facility]

## 2022-03-11 ENCOUNTER — HOSPITAL ENCOUNTER (OUTPATIENT)
Dept: PHYSICAL THERAPY | Age: 47
Discharge: HOME | End: 2022-03-11
Payer: OTHER GOVERNMENT

## 2022-03-11 PROCEDURE — 97110 THERAPEUTIC EXERCISES: CPT

## 2022-03-11 PROCEDURE — 97530 THERAPEUTIC ACTIVITIES: CPT

## 2022-03-11 PROCEDURE — 97112 NEUROMUSCULAR REEDUCATION: CPT

## 2022-03-11 NOTE — PROGRESS NOTES
[facility] at the [address], [place] rowe, [address] [facility]  Phone: [phone]      Fax:  [phone]    Progress Note    Patient name: Anabella Boles Start of Care: 2022   Referral source: Sol Huston,* : 1975               Medical Diagnosis: Left knee pain [M25.562]    Onset RX, inc pain                Treatment Diagnosis: left knee pain   Prior Hospitalization: see medical history Provider#: [id number]   Medications: Verified on Patient summary List    Co-morbidities: PMHx/Surgical Hx: []??DM []?? HTN (controlled) [x]? ? High cholesterol (controlled) []? ? Cancer [x]? ? Other back pain, hip pain, knee pain, bilateral plantar fascitis, neck pain  Prior Level of Function:  functionally independent, no AD, pain in the knee  Social/Recreation/Work: Work Hx: retired  Recreational Activities: raising two small kids-angelcam           Visits from [facility]: 7    Missed Visits: 1    Progress Towards Goals: Short Term Goals:   To be accomplished in 2 weeks: 1. Patient will report compliance with HEP 1x/day to aid in rehabilitation program.  Status at IE: Provided pt with HEP and pt appeared to understand. Current:pt reports compliance, except for yesterday progressing 3/11/22-goal to be met in 4 more weeks     2.Patient will rate pain on greater than 8/10 so pt can perform ADL's. Status at IE:pt rates worst pain 10/10, least pain 10/10, current pain 10/10  Current: 3/11/22 worst pain in the last 48 hrs 5/10, least pain 3/10-gaol to be met in 4 more weeks     Long Term Goals: To be accomplished in 8 weeks: 1. Patient will increase limited Bilateral LE strength by at least . 5 grade MMT throughout so pt can perform stairs.   Status at IE:    Left (0-5) Right (0-5) N/T   Knee extension 5- 5 []????????    Knee flexion 5 5 []????????    Ankle Dorsiflexion (L4) 4+ 4+ []????????    Ankle Plantarflexion (S1)  #heel raises     [x]????????  Hip flexion 5- 5- []????????    Hip abduction 4 4 []????????    Hip ADDuction     [x]????????    Hip ER 4- 4 []????????    Hip IR 4- 4 []????????    Hip extension 3+ 4- []????????    Glute Max 3+ 3+ []????????    Glute Med 4- 4- []????????       Current: 3/11/22-part'l MET-goal to be met in 4 more weeks     Left (0-5) Right (0-5) N/T   Knee extension 5 5 []? ?    Knee flexion 5 5 []? ?    Ankle Dorsiflexion (L4) 4+ 4+ []? ?    Ankle Plantarflexion (S1)  #heel raises     [x]? ?    Hip flexion 5- 5- []??    Hip abduction 4 4 []? ?    Hip ADDuction     [x]? ?    Hip ER 4 4+ []??    Hip IR 4- 4 []? ?    Hip extension 3+ 4- []??    Glute Max 3+ 3+ []? ?    Glute Med 4- 4- []??          2. Patient will report pain no greater than 6/10 throughout entire day to aid in completion of ADLs. Status at IE:see STG  Current: 3/11/22-worst pain in the last 48 hrs 5/10, least pain 3/10-gaol to be met in 4 more weeks        3. Patient will have pain free full knee flexion; increase limited ankle and hip ROM by 5 degrees so pt can  objects off the ground. Status at IE:              Knee Left Right   Extension Lacking 2  0   Flexion 142 (pain at end range and pain with returning to neutral) 141                    Hip Left Right   Flexion 75 82                                                     Ankle Left Right   Dorsiflexion 4 (pain in the knee) 0   Current: 3/11/22-slow progression-goal to be met in 4 more weeks                                                              Hip PROM Left Right   Flexion 75 82    hip ER: Left: 30 right: 25  Hip IR: left: 30 ( knee pain)  Right: 30                                       4. Patient will improve FOTO score to 79 points to demonstrate improvement in functional status.   Status at IE:77  Current:3/11/22: taken on 3/8/22 70 regression, but she answered initially with the mind set of \"no pain no gain\"- goal to be met in 4 more weeks     *FOTO score is an established functional score where 100 = no disability*     5.Pt will have 2 inch improvement in LTR so pt can have the mobility in the lumbar spine to perform a proper squat to be able to pick objects off the ground. Status at IE: LTR: right: 18 increases; Left: 18.5 inches   Current: 3/11/22 LTR: right: 17 increases; Left: 17.5 [id number] to be met in 4 more weeks        6. Pt will perform proper squat with femurs close to parallel to the ground, minimal anterior tibial translation, weight posteriorly onto heels, no heel lift, no genu valgus, good posterior pelvic tilt. Status at IE: Wide base of support, no lumbar flexion, anterior tibial translation, heel lift  Current: 3/11/22 Wide base of support, no lumbar flexion, anterior tibial translation, heel lift; with verbal cuing cuing less anterior tibial translation, but performed more of a mini squat-goal to be met in 4 more weeks          Key Functional Changes: Anna Rolon a 41 y.o.  yo female who [facility] to [facility] diagnosed with patellofemoral disorder of left knee. Patient is s/p fall RV [id number] with inc pain in 2003. Pt has had intermittent pain and locking since then, but just now getting treatment. Per MRI reports pt with left lateral meniscal tear. This is patients 7th visit including evaluation on 2/14/22. Pt has progressed with therapy as per reports. Pt is able to perform ADLs and shower. Pt with inc in certain MMT and hip ROM. Pt also able to perform stairs easier. Although pt progressing, pt continues to have pain, rating worst pain in the last 48 hrs 5/10, least pain 3/10. Pt continues to have dec in LE MMT and LE ROM. Pt also with impaired mechanics with squatting as pt with a tendency to shift weight forward. Pt also continues to have bilateral adduction drop test indicating lumbo-pelvic femoral dysfunction.  Pt would benefit from continuing with skilled PT to further address impairments.        Patient will continue to benefit from skilled PT services to modify and progress therapeutic interventions, address functional mobility deficits, address ROM deficits, address strength deficits, analyze and address soft tissue restrictions, analyze and cue movement patterns, analyze and modify body mechanics/ergonomics, assess and modify postural abnormalities and instruct in home and community integration to attain remaining goals.   Updated Goals: to be achieved in 4 more weeks:   Please see above  ASSESSMENT/RECOMMENDATIONS:  [x]Continue therapy per initial plan/protocol at a frequency of  2 x per week for 4 more weeks  []Continue therapy with the following recommended changes:_____________________      []Discontinue therapy progressing towards or have reached established goals  []Discontinue therapy due to lack of appreciable progress towards goals  []Discontinue therapy due to lack of attendance or compliance  []Await Physician's recommendations/decisions regarding therapy  []Other:________________________________________________________________    Thank you for this referral.   Ko Carey, PT, DPT, CIMT 3/11/2022 8:39 AM

## 2022-03-11 NOTE — PROGRESS NOTES
PT DAILY TREATMENT NOTE    Patient Name: Lilo Varghese  Date:3/11/2022  : 1975  [x]  Patient  Verified  Payor:  / Plan: [facility]  [facility] REGION / Product Type:  /    In time:12:49  Out time:1:36  Total Treatment Time (min): 47  Total Timed Codes (min): 47  1:1 Treatment Time (MC/BCBS only): 52   Visit #: 7 of 16    Treatment Dx: Left knee pain [M25.562]    SUBJECTIVE  Pain Level (0-10 scale): 4/10  Any medication changes, allergies to medications, adverse drug reactions, diagnosis change, or new procedure performed?: [x] No    [] Yes (see summary sheet for update)  Subjective functional status/changes:   [] No changes reported  Pt reports that the worst pain in the last 48 hrs 5/10, least pain 3/10. Reports that she is compliant with HEP, but not Wednesday. Reports that standing to shower is good and able to do most chores. Reports that stairs are still hurt. Reports that squatting to pick objects up are difficult and can't go straight down. Reports that she would like to continue with therapy.      OBJECTIVE      8 min Therapeutic Exercise:  [] See flow sheet :   Rationale: increase ROM, increase strength, improve coordination, improve balance and increase proprioception to improve the patients ability to perform daily activities with decreased pain and symptom levels      10 min Therapeutic Activity:  []  See flow sheet :   Rationale: increase ROM, increase strength, improve coordination, improve balance and increase proprioception  to improve the patients ability to perform daily activities with decreased pain and symptom levels       24 min Neuromuscular Re-education:  []  See flow sheet :   Rationale: increase ROM, increase strength, improve coordination, improve balance and increase proprioception  to improve the patients ability to perform daily activities with decreased pain and symptom levels      5  NC min Manual Therapy:  Pt in left sidelying with bolster under left trunk, right LE extended with peanut bolster under leg, MFR superficial to Right QL with right iliac depression, manual stretch of left posterior capsule   Rationale: decrease pain, increase ROM, decrease trigger points and increase postural awareness to improve the patients ability to perform daily activities with decreased pain and symptom levels    The manual therapy interventions were performed at a separate and distinct time from the therapeutic activities interventions. With   [x] TE   [x] TA   [x] neuro   [] other: Patient Education: [x] Review HEP    [] Progressed/Changed HEP based on:   [x] positioning   [x] body mechanics   [x] transfers   [] heat/ice application    [] other:      Other Objective/Functional Measures: Pt enters gym in no apparent distress.     Palpation: TTP:  Left: patellar tendon, lateral joint line, lateral femoral condyle, medial patella, medial joint line, medial femoral condyle, medial tibial condyle, increased tonicity in the quad    ADD drop test: positive bilaterally                 LTR: right: 17 increases; Left: 17.5 inches                                      PROM                                  Hip Left Right   Flexion 75 82    hip ER: Left: 30 right: 25  Hip IR: left: 30 ( knee pain)  Right: 30                                          Strength:   []? Unable to assess at this time     Left (0-5) Right (0-5) N/T   Knee extension 56 5 []?    Knee flexion 5 5 []?    Ankle Dorsiflexion (L4) 4+ 4+ []?    Ankle Plantarflexion (S1)  #heel raises     [x]?    Hip flexion 5- 5- []?    Hip abduction 4 4 []?    Hip ADDuction     [x]?    Hip ER 4 4+ []?    Hip IR 4- 4 []?    Hip extension 3+ 4- []?    Glute Max 3+ 3+ []?    Glute Med 4- 4- []?                Balance: SLS: Left: Slight left toe out, inc pronation, inc tibial rotation, 45\" feel it more than felt the right Right:  Right toe out, increased pronation, tibial rotation 45\"     Squat:   Wide base of support, no lumbar flexion, anterior tibial translation, heel lift; with verbal cuing cuing less anterior tibial translation, but performed more of a mini squat       Pain Level (0-10 scale) post treatment: \" I feel good\"    ASSESSMENT/Changes in Function: Radha Mcmillan is a 41 y.o.  yo female who presents to [facility] diagnosed with patellofemoral disorder of left knee. Patient is s/p fall in 1994 with inc pain in 2003. Pt has had intermittent pain and locking since then, but just now getting treatment. Per MRI reports pt with left lateral meniscal tear. This is patients 7th visit including evaluation on 2/14/22. Pt has progressed with therapy as per reports. Pt is able to perform ADLs and shower. Pt with inc in certain MMT and hip ROM. Pt also able to perform stairs easier. Although pt progressing, pt continues to have pain, rating worst pain in the last 48 hrs 5/10, least pain 3/10. Pt continues to have dec in LE MMT and LE ROM. Pt also with impaired mechanics with squatting as pt with a tendency to shift weight forward. Pt also continues to have bilateral adduction drop test indicating lumbo-pelvic femoral dysfunction. Pt would benefit from continuing with skilled PT to further address impairments. Patient will continue to benefit from skilled PT services to modify and progress therapeutic interventions, address functional mobility deficits, address ROM deficits, address strength deficits, analyze and address soft tissue restrictions, analyze and cue movement patterns, analyze and modify body mechanics/ergonomics, assess and modify postural abnormalities and instruct in home and community integration to attain remaining goals. []  See Plan of Care  [x]  See progress note/recertification  []  See Discharge Summary         Progress towards goals / Updated goals:  Short Term Goals:   To be accomplished in 2 weeks: 1. Patient will report compliance with HEP 1x/day to aid in rehabilitation program.  Status at IE: Provided pt with HEP and pt appeared to understand. Current:pt reports compliance, except for yesterday progressing 3/11/22-goal to be met in 4 more weeks     2.Patient will rate pain on greater than 8/10 so pt can perform ADL's. Status at IE:pt rates worst pain 10/10, least pain 10/10, current pain 10/10  Current: 3/11/22 worst pain in the last 48 hrs 5/10, least pain 3/10-gaol to be met in 4 more weeks     Long Term Goals: To be accomplished in 8 weeks: 1. Patient will increase limited Bilateral LE strength by at least . 5 grade MMT throughout so pt can perform stairs. Status at IE:    Left (0-5) Right (0-5) N/T   Knee extension 5- 5 []???????    Knee flexion 5 5 []???????    Ankle Dorsiflexion (L4) 4+ 4+ []???????    Ankle Plantarflexion (S1)  #heel raises     [x]???????    Hip flexion 5- 5- []???????    Hip abduction 4 4 []???????    Hip ADDuction     [x]???????    Hip ER 4- 4 []???????    Hip IR 4- 4 []???????    Hip extension 3+ 4- []???????    Glute Max 3+ 3+ []???????    Glute Med 4- 4- []???????       Current: 3/11/22-part'l MET-goal to be met in 4 more weeks     Left (0-5) Right (0-5) N/T   Knee extension 5 5 []?    Knee flexion 5 5 []?    Ankle Dorsiflexion (L4) 4+ 4+ []?    Ankle Plantarflexion (S1)  #heel raises     [x]?    Hip flexion 5- 5- []?    Hip abduction 4 4 []?    Hip ADDuction     [x]?    Hip ER 4 4+ []?    Hip IR 4- 4 []?    Hip extension 3+ 4- []?    Glute Max 3+ 3+ []?    Glute Med 4- 4- []?         2. Patient will report pain no greater than 6/10 throughout entire day to aid in completion of ADLs. Status at IE:see STG  Current: 3/11/22-worst pain in the last 48 hrs 5/10, least pain 3/10-gaol to be met in 4 more weeks        3. Patient will have pain free full knee flexion; increase limited ankle and hip ROM by 5 degrees so pt can  objects off the ground.   Status at IE:              Knee Left Right   Extension Lacking 2  0   Flexion 142 (pain at end range and pain with returning to neutral) 141                  Hip Left Right   Flexion 75 82                                                     Ankle Left Right   Dorsiflexion 4 (pain in the knee) 0   Current: 3/11/22-slow progression-goal to be met in 4 more weeks                                                              Hip PROM Left Right   Flexion 75 82    hip ER: Left: 30 right: 25  Hip IR: left: 30 ( knee pain)  Right: 30                                       4.Patient will improve FOTO score to 79 points to demonstrate improvement in functional status. Status at IE:77  Current:3/11/22: taken on 3/8/22 70 regression, but she answered initially with the mind set of \"no pain no gain\"- goal to be met in 4 more weeks     *FOTO score is an established functional score where 100 = no disability*     5.Pt will have 2 inch improvement in LTR so pt can have the mobility in the lumbar spine to perform a proper squat to be able to pick objects off the ground. Status at IE: LTR: right: 18 increases; Left: 18.5 inches   Current: 3/11/22 LTR: right: 17 increases; Left: 17.5 [id number] to be met in 4 more weeks        6. Pt will perform proper squat with femurs close to parallel to the ground, minimal anterior tibial translation, weight posteriorly onto heels, no heel lift, no genu valgus, good posterior pelvic tilt.   Status at IE: Wide base of support, no lumbar flexion, anterior tibial translation, heel lift  Current: 3/11/22 Wide base of support, no lumbar flexion, anterior tibial translation, heel lift; with verbal cuing cuing less anterior tibial translation, but performed more of a mini squat-goal to be met in 4 more weeks           PLAN  []  Upgrade activities as tolerated     [x]  Continue plan of care  []  Update interventions per flow sheet       []  Discharge due to:_  [x]  Other:_ Continue with skilled PT 2x/week for 4 more weeks     Kaylyn De La Torre, PT, DPT, CIMT 3/11/2022  8:36 AM    Future Appointments   Date Time Provider Department Center   3/11/2022 12:45 PM Nurys Jean Baptiste PT [facility]   3/16/2022 11:15 AM Kitty Harris PA [facility]

## 2022-03-15 ENCOUNTER — HOSPITAL ENCOUNTER (OUTPATIENT)
Dept: PHYSICAL THERAPY | Age: 47
Discharge: HOME | End: 2022-03-15
Payer: OTHER GOVERNMENT

## 2022-03-15 PROCEDURE — 97110 THERAPEUTIC EXERCISES: CPT

## 2022-03-15 PROCEDURE — 97112 NEUROMUSCULAR REEDUCATION: CPT

## 2022-03-15 PROCEDURE — 97530 THERAPEUTIC ACTIVITIES: CPT

## 2022-03-15 PROCEDURE — 97016 VASOPNEUMATIC DEVICE THERAPY: CPT

## 2022-03-15 NOTE — PROGRESS NOTES
PT DAILY TREATMENT NOTE    Patient Name: Gladys Presley  Date:3/15/2022  : 1975  [x]  Patient  Verified  Payor:  / Plan: Samm Baldwin 76 / Product Type:  /    In time:12;46pm  Out time:1: 39pm  Total Treatment Time (min): 53  Total Timed Codes (min): 53  1:1 Treatment Time (MC/BCBS only): 48   Visit #: 8 of 15    Treatment Dx: Left knee pain [M25.562]    SUBJECTIVE  Pain Level (0-10 scale): 5  Any medication changes, allergies to medications, adverse drug reactions, diagnosis change, or new procedure performed?: [x] No    [] Yes (see summary sheet for update)  Subjective functional status/changes:   [] No changes reported  The pt reported that her left knee felt swollen today - long term standing     OBJECTIVE    Modalities Rationale:     decrease edema, decrease inflammation and decrease pain to improve patient's ability to return to PLOF   min [] Estim, type/location:                                      []  att     []  unatt     []  w/US     []  w/ice    []  w/heat    min []  Mechanical Traction: type/lbs                   []  pro   []  sup   []  int   []  cont    []  before manual    []  after manual    min []  Ultrasound, settings/location:      min []  Iontophoresis w/ dexamethasone, location:                                               []  take home patch       []  in clinic    min []  Ice     []  Heat    location/position:    10 min [x]  Vasopneumatic Device, press/temp: Left knee - LE elevated    If using vaso (only need to measure limb vaso being performed on)      pre-treatment girth : 41 cm       post-treatment girth : 41 cm       measured at (landmark location) :  Mid-patella     min []  Other:    [] Skin assessment post-treatment (if applicable):    []  intact    []  redness- no adverse reaction                  []redness - adverse reaction:      21 min Therapeutic Exercise:  [x] See flow sheet :   Rationale: increase ROM, increase strength and improve coordination to improve the patients ability to perform daily activities with decreased pain and symptom levels    9 min Therapeutic Activity:  [x]  See flow sheet :   Rationale: increase ROM, increase strength and improve coordination  to improve the patients ability to perform daily activities with decreased pain and symptom levels     13 min Neuromuscular Re-education:  [x]  See flow sheet :   Rationale: increase ROM, increase strength, improve coordination, improve balance and increase proprioception  to improve the patients ability to perform daily activities with decreased pain and symptom levels          With   [] TE   [] TA   [] neuro   [] other: Patient Education: [x] Review HEP    [] Progressed/Changed HEP based on:   [] positioning   [] body mechanics   [] transfers   [] heat/ice application    [] other:      Other Objective/Functional Measures: observed minimal to moderate elevation during bridge- pt demo muscle fatigue      mid patella circumference: right = 39.5 cm, left = 41 cm     Pain Level (0-10 scale) post treatment: 0 (\"numb\")    ASSESSMENT/Changes in Function: The pt reported to therapy with a pleasant attitude and ready to participate in therapeutic exercises. Patient tolerated treatment session well today. Patient had no complaints with addition of bosu step up with cross and  Wedge march with arm swing to exercise program to accomplish an increase dynamic stability . Pt required multiple tactile cues, verbal cues, and demo for S/L left adductor pull back (WILLIAN) to complete with proper body mechanics. Patient continues to make good progress toward goals and would benefit from continued skilled PT intervention to address remaining deficits outlined in goals below.       Patient will continue to benefit from skilled PT services to modify and progress therapeutic interventions, address functional mobility deficits, address ROM deficits, address strength deficits, analyze and address soft tissue restrictions, analyze and cue movement patterns, analyze and modify body mechanics/ergonomics, assess and modify postural abnormalities and address imbalance/dizziness to attain remaining goals. [x]  See Plan of Care  []  See progress note/recertification  []  See Discharge Summary         Progress towards goals / Updated goals:  Short Term Goals:   To be accomplished in 2 weeks: 1. Patient will report compliance with HEP 1x/day to aid in rehabilitation program.  Status at IE: Provided pt with HEP and pt appeared to understand. Last PN:pt reports compliance, except for yesterday progressing 3/11/22-goal to be met in 4 more weeks     2.Patient will rate pain on greater than 8/10 so pt can perform ADL's. Status at IE:pt rates worst pain 10/10, least pain 10/10, current pain 10/10  Last PN: 3/11/22 worst pain in the last 48 hrs 5/10, least pain 3/10-gaol to be met in 4 more weeks     Long Term Goals: To be accomplished in 8 weeks: 1. Patient will increase limited Bilateral LE strength by at least . 5 grade MMT throughout so pt can perform stairs. Status at IE:    Left (0-5) Right (0-5) N/T   Knee extension 5- 5 []?????????    Knee flexion 5 5 []?????????    Ankle Dorsiflexion (L4) 4+ 4+ []?????????    Ankle Plantarflexion (S1)  #heel raises     [x]?????????    Hip flexion 5- 5- []?????????    Hip abduction 4 4 []?????????    Hip ADDuction     [x]?????????    Hip ER 4- 4 []?????????    Hip IR 4- 4 []?????????    Hip extension 3+ 4- []?????????    Glute Max 3+ 3+ []?????????    Glute Med 4- 4- []?????????       Last PN: 3/11/22-part'l MET-goal to be met in 4 more weeks     Left (0-5) Right (0-5) N/T   Knee extension 5 5 []? ??    Knee flexion 5 5 []? ??    Ankle Dorsiflexion (L4) 4+ 4+ []???    Ankle Plantarflexion (S1)  #heel raises     [x]? ??    Hip flexion 5- 5- []???    Hip abduction 4 4 []? ??    Hip ADDuction     [x]? ??    Hip ER 4 4+ []???    Hip IR 4- 4 []? ??    Hip extension 3+ 4- []???    Glute Max 3+ 3+ []???    Glute Med 4- 4- []???          2. Patient will report pain no greater than 6/10 throughout entire day to aid in completion of ADLs. Status at IE:see STG  Last PN: 3/11/22-worst pain in the last 48 hrs 5/10, least pain 3/10-gaol to be met in 4 more weeks        3. Patient will have pain free full knee flexion; increase limited ankle and hip ROM by 5 degrees so pt can  objects off the ground. Status at IE:              Knee Left Right   Extension Lacking 2  0   Flexion 142 (pain at end range and pain with returning to neutral) 141                    Hip Left Right   Flexion 75 82                                                     Ankle Left Right   Dorsiflexion 4 (pain in the knee) 0   Last PN: 3/11/22-slow progression-goal to be met in 4 more weeks                                                              Hip PROM Left Right   Flexion 75 82    hip ER: Left: 30 right: 25  Hip IR: left: 30 ( knee pain)  Right: 30                                       4. Patient will improve FOTO score to 79 points to demonstrate improvement in functional status. Status at IE:77  Last PN :3/11/22: taken on 3/8/22 70 regression, but she answered initially with the mind set of \"no pain no gain\"- goal to be met in 4 more weeks     *FOTO score is an established functional score where 100 = no disability*     5.Pt will have 2 inch improvement in LTR so pt can have the mobility in the lumbar spine to perform a proper squat to be able to pick objects off the ground. Status at IE: LTR: right: 18 increases; Left: 18.5 inches   Last PN: 3/11/22 LTR: right: 17 increases; Left: 17.5 [id number]-erom to be met in 4 more weeks        6. Pt will perform proper squat with femurs close to parallel to the ground, minimal anterior tibial translation, weight posteriorly onto heels, no heel lift, no genu valgus, good posterior pelvic tilt.   Status at IE: Wide base of support, no lumbar flexion, anterior tibial translation, heel lift  Last PN: 3/11/22 Wide base of support, no lumbar flexion, anterior tibial translation, heel lift; with verbal cuing cuing less anterior tibial translation, but performed more of a mini squat-goal to be met in 4 more weeks    PLAN  []  Upgrade activities as tolerated     [x]  Continue plan of care  []  Update interventions per flow sheet       []  Discharge due to:_  []  Other:_      Renata Ramirez, JASON 3/15/2022  1:04 PM    Future Appointments   Date Time Provider Amanda Al   3/16/2022 11:15 AM JAVIER Naranjo [facility]   4/5/2022 10:45 AM Isabell Elena PT [facility]   4/8/2022  1:00 PM Chata Rankin [facility]   4/12/2022 12:45 PM Isabell Elena PT [facility]   4/15/2022 12:45 PM Isablel Elena PT [facility]   4/19/2022 12:45 PM Isabell Elena PT [facility]

## 2022-03-16 ENCOUNTER — OFFICE VISIT (OUTPATIENT)
Dept: ORTHOPEDIC SURGERY | Age: 47
End: 2022-03-16
Payer: OTHER GOVERNMENT

## 2022-03-16 VITALS
TEMPERATURE: 98.2 F | HEIGHT: 67 IN | BODY MASS INDEX: 27.31 KG/M2 | HEART RATE: 73 BPM | OXYGEN SATURATION: 100 % | WEIGHT: 174 LBS

## 2022-03-16 DIAGNOSIS — S83.282A TEAR OF LATERAL MENISCUS OF LEFT KNEE, CURRENT, UNSPECIFIED TEAR TYPE, INITIAL ENCOUNTER: Primary | ICD-10-CM

## 2022-03-16 PROCEDURE — 99213 OFFICE O/P EST LOW 20 MIN: CPT | Performed by: PHYSICIAN ASSISTANT

## 2022-03-16 NOTE — PATIENT INSTRUCTIONS
Dr. Imelda Velasquez Knee Arthroscopy Surgery    What is the surgery? - This is an outpatient procedure at either [place] [address] or [address] [place] [address] Rd will be completely asleep for procedure. Dr. Imelda Velasquez will make 2 small incisions in your knee. He will take a tour of your knee with the camera and then address the meniscal tear(s). We will be able to evaluate if any arthritis in your knee but this surgery is not to treat your arthritis. - Total surgery takes about 25-30 mins     What can you expect after surgery? - You will have a bulky dressing on your knee that you can remove 2 days after surgery. You will be able to shower 2 days after surgery but no soaking in a bath, hot tub, ocean or pool x 2 weeks to allow for full wound healing. No special brace is needed. - You will be on crutches or a walker when you leave the hospital. You can place weight on your leg as tolerated starting immediately. You are usually on crutches or your walker for about 4-5 days.   - Even though you can place weight on your leg, we recommend no walking or standing longer than 10mins for the first week. We will gradually increase your activities after that point.    - Dr. Imelda Velasquez will start physical therapy for you when you return for your 1 week post op apt  - It will take your 6-8 weeks to fully recover from your surgery. When can I return to work? - Most patients return to desk work only after 1-2 weeks. We recommend no prolonged walking or standing, climbing, kneeling, or crawling x 6-8 weeks. Not all knee arthroscopies are the same. The specifics of your individual case will be discussed at length with you by Dr. Imelda Velasquez and his Physician Assistant. Priyanka Rosas  Surgical Coordinator  [address] [facility] [address].  [address], [email or web address]  P: [phone]  F: [phone]

## 2022-03-16 NOTE — PROGRESS NOTES
Jared Rasheed  1975   Chief Complaint   Patient presents with    Knee Pain     F/U PT LT Knee        HISTORY OF PRESENT ILLNESS  Jared Rasheed is a 41 y.o. female who presents today for evaluation of left knee. She rates her pain 5/10 today. Pain has been present for almost 20 years. Pt has been to PT and taken Mobic with minimal relief. She notes a catching and locking sensation with associated throbbing and aching pain. She has been in the [employer] and has not gotten treatment in the past 10 years. She has pain with climbing stairs. She notes stiffness after staying in one position for too long. Patient denies any fever, chills, chest pain, shortness of breath or calf pain. The remainder of the review of systems is negative. There are no new illness or injuries to report since last seen in the office. There are no changes to medications, allergies, family or social history. PHYSICAL EXAM:   Visit Vitals  Pulse 73   Temp 98.2 °F (36.8 °C) (Temporal)   Ht 5' 7\" (1.702 m)   Wt 174 lb (78.9 kg)   SpO2 100%   BMI 27.25 kg/m²     The patient is a well-developed, well-nourished female   in no acute distress. The patient is alert and oriented times three. The patient is alert and oriented times three. Mood and affect are normal.  LYMPHATIC: lymph nodes are not enlarged and are within normal limits  SKIN: normal in color and non tender to palpation. There are no bruises or abrasions noted. NEUROLOGICAL: Motor sensory exam is within normal limits. Reflexes are equal bilaterally.  There is normal sensation to pinprick and light touch  MUSCULOSKELETAL:  Examination Left knee   Skin Intact   Range of motion 0-130   Effusion -   Medial joint line tenderness +   Lateral joint line tenderness -   Tenderness Pes Bursa -   Tenderness insertion MCL -   Tenderness insertion LCL -   Shiras -   Patella crepitus -   Patella grind -   Lachman -   Pivot shift -   Anterior drawer -   Posterior drawer -   Varus stress -   Valgus stress -   Neurovascular Intact   Calf Swelling and Tenderness to Palpation -   Tara's Test -   Hamstring Cord Tightness -        PROCEDURE: none    IMAGING:    MRI of left knee dated 1/17/2022 was reviewed and read by myself reveals:   1. Small free edge radial tear involving the midportion of the midbody of the lateral meniscus   2. Very mild edema in the fat interposed between the intact iliotibial band and the lateral femoral condyle can be seen with mild distal iliotibial band friction syndrome. IMPRESSION:      ICD-10-CM ICD-9-CM    1. Tear of lateral meniscus of left knee, current, unspecified tear type, initial encounter  S83.282A 836.1         PLAN:  1. I discussed the risks and benefits and potential adverse outcomes of both operative vs non operative treatment of left knee lateral meniscus tear with the patient and patient wishes to proceed with left knee arthroscopic partial lateral meniscectomy. Risks of operative intervention include but not limited to bleeding, infection, deep vein thrombosis, pulmonary embolism, death, limb length discrepancy, reflexive sympathetic dystrophy, fat embolism syndrome,damage to blood vessels and nerves, malunion, non-union, delayed union, failure of hardware, post traumatic arthritis, stroke, heart attack, and death. Patient understands that infection may arise and may require numerous surgeries. The patient was counseled at length about the risks of alfonso Covid-19 during their perioperative period and any recovery window from their procedure. The patient was made aware that alfonso Covid-19  may worsen their prognosis for recovering from their procedure and lend to a higher morbidity and/or mortality risk. All material risks, benefits, and reasonable alternatives including postponing the procedure were discussed. The patient does  wish to proceed with the procedure at this time.        History and physical exam to be preformed at a later date. Risk factors include: n/a  2. No ultrasound exam indicated today  3. No cortisone injection indicated today   4. No Physical/Occupational Therapy indicated today  5. No diagnostic test indicated today:   6. No durable medical equipment indicated today  7. No referral indicated today   8. No medications indicated today  9.  No Narcotic indicated today     RTC H&P      Scribed by Shira Sanderson [address]) as dictated by KALLI Kc PA-C [facility] [phone] and Spine Specialist

## 2022-03-18 ENCOUNTER — HOSPITAL ENCOUNTER (OUTPATIENT)
Dept: PHYSICAL THERAPY | Age: 47
Discharge: HOME | End: 2022-03-18
Payer: OTHER GOVERNMENT

## 2022-03-18 PROCEDURE — 97112 NEUROMUSCULAR REEDUCATION: CPT

## 2022-03-18 PROCEDURE — 97110 THERAPEUTIC EXERCISES: CPT

## 2022-03-18 PROCEDURE — 97530 THERAPEUTIC ACTIVITIES: CPT

## 2022-03-18 NOTE — PROGRESS NOTES
PT DAILY TREATMENT NOTE    Patient Name: Sloan Villalba  Date:3/18/2022  : 1975  [x]  Patient  Verified  Payor:  / Plan: [facility]  [facility] / Product Type:  /    In time:10:46  Out time:11:45  Total Treatment Time (min): 59  Total Timed Codes (min): 59  1:1 Treatment Time (MC/BCBS only): 61   Visit #: 9 of 15    Treatment Dx: Left knee pain [M25.562]    SUBJECTIVE  Pain Level (0-10 scale): 5  Any medication changes, allergies to medications, adverse drug reactions, diagnosis change, or new procedure performed?: [x] No    [] Yes (see summary sheet for update)  Subjective functional status/changes:   [] No changes reported  \"Pt reporting getting scope sx 22. Next MD appt on 22. \"     OBJECTIVE      10 min Therapeutic Exercise:  [x] See flow sheet :   Rationale: increase ROM and increase strength to improve the patients ability to perform daily activities with decreased pain and symptom levels    19 min Therapeutic Activity:  [x]  See flow sheet :   Rationale: increase strength, improve coordination, improve balance and increase proprioception  to improve the patients ability to perform daily activities with decreased pain and symptom levels     30 min Neuromuscular Re-education:  [x]  See flow sheet :   Rationale: increase strength, improve coordination, improve balance and increase proprioception  to improve the patients ability to perform daily activities with decreased pain and symptom levels    With   [] TE   [] TA   [] neuro   [] other: Patient Education: [x] Review HEP    [] Progressed/Changed HEP based on:   [] positioning   [] body mechanics   [] transfers   [] heat/ice application    [] other:      Other Objective/Functional Measures:   WILLIAN Special Tests (pre/post)  HGIR:                                                 Right: 90*/             Left: 90*/    Hip Add Drop Test:                           Right: +/midline            Left:+/midline  Trunk Rot Right: 19in/18in    Left 18in /17in       Pain Level (0-10 scale) post treatment: 5    ASSESSMENT/Changes in Function: Pt tolerated session well with reporting no increase in left knee pain post session. Pt continues to compensate at lumbar spine with exercises needing cues to correct. WILLIAN measures improved with non manual techniques however pt would benefit from continued techniques to decrease lumbar compensation. Overview of mechanics at stairs with decrease pain reported with cues to lean trunk forward especially descending. Patient will continue to benefit from skilled PT services to modify and progress therapeutic interventions, address functional mobility deficits, address ROM deficits, address strength deficits, analyze and cue movement patterns, analyze and modify body mechanics/ergonomics, assess and modify postural abnormalities and instruct in home and community integration to attain remaining goals. [x]  See Plan of Care  []  See progress note/recertification  []  See Discharge Summary         Progress towards goals / Updated goals:  Short Term Goals:   To be accomplished in 2 weeks: 1. Patient will report compliance with HEP 1x/day to aid in rehabilitation program.  Status at IE: Provided pt with HEP and pt appeared to understand. Last PN:pt reports compliance, except for yesterday progressing 3/11/22-goal to be met in 4 more weeks     2.Patient will rate pain on greater than 8/10 so pt can perform ADL's. Status at IE:pt rates worst pain 10/10, least pain 10/10, current pain 10/10  Last PN: 3/11/22 worst pain in the last 48 hrs 5/10, least pain 3/10-gaol to be met in 4 more weeks     Long Term Goals: To be accomplished in 8 weeks: 1. Patient will increase limited Bilateral LE strength by at least . 5 grade MMT throughout so pt can perform stairs.   Status at IE:    Left (0-5) Right (0-5) N/T   Knee extension 5- 5 []??????????    Knee flexion 5 5 []??????????  Ankle Dorsiflexion (L4) 4+ 4+ []??????????    Ankle Plantarflexion (S1)  #heel raises     [x]??????????    Hip flexion 5- 5- []??????????    Hip abduction 4 4 []??????????    Hip ADDuction     [x]??????????    Hip ER 4- 4 []??????????    Hip IR 4- 4 []??????????    Hip extension 3+ 4- []??????????    Glute Max 3+ 3+ []??????????    Glute Med 4- 4- []??????????       Last PN: 3/11/22-part'l MET-goal to be met in 4 more weeks     Left (0-5) Right (0-5) N/T   Knee extension 5 5 []????    Knee flexion 5 5 []????    Ankle Dorsiflexion (L4) 4+ 4+ []????    Ankle Plantarflexion (S1)  #heel raises     [x]? ???    Hip flexion 5- 5- []????    Hip abduction 4 4 []????    Hip ADDuction     [x]? ???    Hip ER 4 4+ []????    Hip IR 4- 4 []????    Hip extension 3+ 4- []????    Glute Max 3+ 3+ []????    Glute Med 4- 4- []????          2. Patient will report pain no greater than 6/10 throughout entire day to aid in completion of ADLs. Status at IE:see STG  Last PN: 3/11/22-worst pain in the last 48 hrs 5/10, least pain 3/10-gaol to be met in 4 more weeks        3. Patient will have pain free full knee flexion; increase limited ankle and hip ROM by 5 degrees so pt can  objects off the ground. Status at IE:              Knee Left Right   Extension Lacking 2  0   Flexion 142 (pain at end range and pain with returning to neutral) 141                    Hip Left Right   Flexion 75 82                                                     Ankle Left Right   Dorsiflexion 4 (pain in the knee) 0   Last PN: 3/11/22-slow progression-goal to be met in 4 more weeks                                                              Hip PROM Left Right   Flexion 75 82    hip ER: Left: 30 right: 25  Hip IR: left: 30 ( knee pain)  Right: 30                                       4. Patient will improve FOTO score to 79 points to demonstrate improvement in functional status.   Status at IE:77  Last PN :3/11/22: taken on 3/8/22 70 regression, but she answered initially with the mind set of \"no pain no gain\"- goal to be met in 4 more weeks     *FOTO score is an established functional score where 100 = no disability*     5.Pt will have 2 inch improvement in LTR so pt can have the mobility in the lumbar spine to perform a proper squat to be able to pick objects off the ground. Status at IE: LTR: right: 18 increases; Left: 18.5 inches   Last PN: 3/11/22 LTR: right: 17 increases; Left: 17.5 [id number]-zeowhrtukad-vwdh to be met in 4 more weeks  Current: 18in right, 17in left post session progressing 3/18/22        6. Pt will perform proper squat with femurs close to parallel to the ground, minimal anterior tibial translation, weight posteriorly onto heels, no heel lift, no genu valgus, good posterior pelvic tilt.   Status at IE: Wide base of support, no lumbar flexion, anterior tibial translation, heel lift  Last PN: 3/11/22 Wide base of support, no lumbar flexion, anterior tibial translation, heel lift; with verbal cuing cuing less anterior tibial translation, but performed more of a mini squat-goal to be met in 4 more weeks        PLAN  []  Upgrade activities as tolerated     [x]  Continue plan of care  []  Update interventions per flow sheet       []  Discharge due to:_  []  Other:_      Tresa Blizzard 3/18/2022  9:11 AM    Future Appointments   Date Time Provider Amanda Al   3/18/2022 10:45 AM Miley Rankin [facility]   4/5/2022 10:45 AM JASON Byrd [facility]   4/8/2022  1:00 PM Miley Rankin [facility]   4/12/2022 12:45 PM JASON Byrd [facility]   4/15/2022 12:45 PM JASON Byrd [facility]   4/18/2022  1:45 PM JAVIER Dutton [facility] AM[facility]   4/19/2022 12:45 PM JASON Byrd [facility]   5/16/2022  1:00 PM Werner Babinski Eleanore Boyer, PA [facility] BS AMB

## 2022-03-21 ENCOUNTER — HOSPITAL ENCOUNTER (OUTPATIENT)
Dept: PHYSICAL THERAPY | Age: 47
Discharge: HOME | End: 2022-03-21
Payer: OTHER GOVERNMENT

## 2022-03-21 PROCEDURE — 97112 NEUROMUSCULAR REEDUCATION: CPT

## 2022-03-21 PROCEDURE — 97110 THERAPEUTIC EXERCISES: CPT

## 2022-03-21 PROCEDURE — 97530 THERAPEUTIC ACTIVITIES: CPT

## 2022-03-21 PROCEDURE — 97016 VASOPNEUMATIC DEVICE THERAPY: CPT

## 2022-03-21 NOTE — PROGRESS NOTES
PT DAILY TREATMENT NOTE    Patient Name: Tootie Braun  Date:3/21/2022  : 1975  [x]  Patient  Verified  Payor:  / Plan: Samm Baldwin 76 / Product Type:  /    In time:11:33am  Out time:12:30pm  Total Treatment Time (min): 57  Total Timed Codes (min): 57  1:1 Treatment Time (MC/BCBS only): N/A   Visit #: 10 of 15    Treatment Dx: Left knee pain [M25.562]    SUBJECTIVE  Pain Level (0-10 scale): 10  Any medication changes, allergies to medications, adverse drug reactions, diagnosis change, or new procedure performed?: [x] No    [] Yes (see summary sheet for update)  Subjective functional status/changes:   [] No changes reported  Pt reported she may have \"over done it over the weekend\". However, she has had an increase in knee pain and swelling since last treatment session. \"No pain, no gain\".      OBJECTIVE    Modalities Rationale:     decrease edema, decrease inflammation and decrease pain to improve patient's ability to return to PLOF   min [] Estim, type/location:                                      []  att     []  unatt     []  w/US     []  w/ice    []  w/heat    min []  Mechanical Traction: type/lbs                   []  pro   []  sup   []  int   []  cont    []  before manual    []  after manual    min []  Ultrasound, settings/location:      min []  Iontophoresis w/ dexamethasone, location:                                               []  take home patch       []  in clinic    min []  Ice     []  Heat    location/position:    10 min [x]  Vasopneumatic Device, press/temp: Left knee - LE elevated    If using vaso (only need to measure limb vaso being performed on)      pre-treatment girth : 41.5 cm       post-treatment girth : 41 cm      measured at (landmark location) :  Mid- patella     min []  Other:    [] Skin assessment post-treatment (if applicable):    []  intact    []  redness- no adverse reaction                  []redness  adverse reaction:        21 min Therapeutic Exercise:  [x] See flow sheet :   Rationale: increase ROM and increase strength to improve the patients ability to return to PLOF    8 min Therapeutic Activity:  -  See flow sheet : PT completed FOTO with the pt for best understanding of the questions    Rationale: increase ROM, increase strength and improve coordination  to improve the patients ability to perform daily activities with decreased pain and symptom levels     18 min Neuromuscular Re-education:  [x]  See flow sheet : Pt required multiple tactile cues, verbal cues, and demo for all S/L WILLIAN exercises to improve body mechanics - observed the pt is challenged to maintain PPT    Rationale: increase ROM, increase strength, improve coordination, improve balance and increase proprioception  to improve the patients ability to perform daily activities with decreased pain and symptom levels    With   [] TE   [] TA   [] neuro   [] other: Patient Education: [x] Review HEP    [] Progressed/Changed HEP based on:   [] positioning   [] body mechanics   [] transfers   [] heat/ice application    [] other:      Other Objective/Functional Measures: updated FOTO score     Pain Level (0-10 scale) post treatment: 8    ASSESSMENT/Changes in Function: The pt reported to therapy with a pleasant attitude and ready to participate in therapeutic exercises. Patient tolerated treatment session well today. Patient had no complaints with addition of sled push to exercise program to accomplish an increase hip flexion and extension  Functional mobility and strength. Patient continues to make good progress toward goals and would benefit from continued skilled PT intervention to address remaining deficits outlined in goals below.       Patient will continue to benefit from skilled PT services to modify and progress therapeutic interventions, address functional mobility deficits, address ROM deficits, address strength deficits, analyze and address soft tissue restrictions, analyze and cue movement patterns, analyze and modify body mechanics/ergonomics, assess and modify postural abnormalities and address imbalance/dizziness to attain remaining goals. [x]  See Plan of Care  []  See progress note/recertification  []  See Discharge Summary         Progress towards goals / Updated goals:  Short Term Goals:   To be accomplished in 2 weeks: 1. Patient will report compliance with HEP 1x/day to aid in rehabilitation program.  Status at IE: Provided pt with HEP and pt appeared to understand. Last PN:pt reports compliance, except for yesterday progressing 3/11/22-goal to be met in 4 more weeks     2.Patient will rate pain on greater than 8/10 so pt can perform ADL's. Status at IE:pt rates worst pain 10/10, least pain 10/10, current pain 10/10  Last PN: 3/11/22 worst pain in the last 48 hrs 5/10, least pain 3/10-gaol to be met in 4 more weeks     Long Term Goals: To be accomplished in 8 weeks: 1. Patient will increase limited Bilateral LE strength by at least . 5 grade MMT throughout so pt can perform stairs.   Status at IE:    Left (0-5) Right (0-5) N/T   Knee extension 5- 5 []???????????    Knee flexion 5 5 []???????????    Ankle Dorsiflexion (L4) 4+ 4+ []???????????    Ankle Plantarflexion (S1)  #heel raises     [x]???????????    Hip flexion 5- 5- []???????????    Hip abduction 4 4 []???????????    Hip ADDuction     [x]???????????    Hip ER 4- 4 []???????????    Hip IR 4- 4 []???????????    Hip extension 3+ 4- []???????????    Glute Max 3+ 3+ []???????????    Glute Med 4- 4- []???????????       Last PN: 3/11/22-part'l MET-goal to be met in 4 more weeks     Left (0-5) Right (0-5) N/T   Knee extension 5 5 []?????    Knee flexion 5 5 []?????    Ankle Dorsiflexion (L4) 4+ 4+ []?????    Ankle Plantarflexion (S1)  #heel raises     [x]?????    Hip flexion 5- 5- []?????    Hip abduction 4 4 []?????    Hip ADDuction     [x]?????    Hip ER 4 4+ []?????    Hip IR 4- 4 []?????    Hip extension 3+ 4- []?????  Glute Max 3+ 3+ []?????    Glute Med 4- 4- []?????          2. Patient will report pain no greater than 6/10 throughout entire day to aid in completion of ADLs. Status at IE:see STG  Last PN: 3/11/22-worst pain in the last 48 hrs 5/10, least pain 3/10-gaol to be met in 4 more weeks        3. Patient will have pain free full knee flexion; increase limited ankle and hip ROM by 5 degrees so pt can  objects off the ground. Status at IE:              Knee Left Right   Extension Lacking 2  0   Flexion 142 (pain at end range and pain with returning to neutral) 141                    Hip Left Right   Flexion 75 82                                                     Ankle Left Right   Dorsiflexion 4 (pain in the knee) 0   Last PN: 3/11/22-slow progression-goal to be met in 4 more weeks                                                              Hip PROM Left Right   Flexion 75 82    hip ER: Left: 30 right: 25  Hip IR: left: 30 ( knee pain)  Right: 30                                       4. Patient will improve FOTO score to 79 points to demonstrate improvement in functional status. Status at IE:77  Last PN :3/11/22: taken on 3/8/22 70 regression, but she answered initially with the mind set of \"no pain no gain\"- goal to be met in 4 more weeks  Current 3/21/22:  69 - REGRESSION ( pt reported that she has improved and this regression does not accurately represent her change in status)      *FOTO score is an established functional score where 100 = no disability*     5.Pt will have 2 inch improvement in LTR so pt can have the mobility in the lumbar spine to perform a proper squat to be able to pick objects off the ground. Status at IE: LTR: right: 18 increases; Left: 18.5 inches   Last PN: 3/11/22 LTR: right: 17 increases; Left: 17.5 [id number] to be met in 4 more weeks  Current: 18in right, 17in left post session progressing 3/18/22        6.  Pt will perform proper squat with femurs close to parallel to the ground, minimal anterior tibial translation, weight posteriorly onto heels, no heel lift, no genu valgus, good posterior pelvic tilt.   Status at IE: Wide base of support, no lumbar flexion, anterior tibial translation, heel lift  Last PN: 3/11/22 Wide base of support, no lumbar flexion, anterior tibial translation, heel lift; with verbal cuing cuing less anterior tibial translation, but performed more of a mini squat-goal to be met in 4 more weeks       PLAN  []  Upgrade activities as tolerated     [x]  Continue plan of care  []  Update interventions per flow sheet       []  Discharge due to:_  []  Other:_      Akbar Cameron, PT 3/21/2022  1:27 PM    Future Appointments   Date Time Provider Amanda Al   3/23/2022 11:30 AM Luis Carlos Roca PT [facility]   4/5/2022 10:45 AM King Yury PT [facility]   4/8/2022  1:00 PM Rosanne Rankin [facility]   4/12/2022 12:45 PM King Yury, PT [facility]   4/15/2022 12:45 PM King Yury, PT [facility]   4/18/2022  1:45 PM JAVIER Raines [facility] AMB   4/19/2022 12:45 PM King Yury, PT [facility]   5/16/2022  1:00 PM JAVIER Mosqueda [facility] AMB

## 2022-03-23 ENCOUNTER — HOSPITAL ENCOUNTER (OUTPATIENT)
Dept: PHYSICAL THERAPY | Age: 47
Discharge: HOME | End: 2022-03-23
Payer: OTHER GOVERNMENT

## 2022-03-23 PROCEDURE — 97016 VASOPNEUMATIC DEVICE THERAPY: CPT

## 2022-03-23 PROCEDURE — 97110 THERAPEUTIC EXERCISES: CPT

## 2022-03-23 PROCEDURE — 97112 NEUROMUSCULAR REEDUCATION: CPT

## 2022-03-23 NOTE — PROGRESS NOTES
PT DAILY TREATMENT NOTE    Patient Name: Sherry De La Vega  Date:3/23/2022  : 1975  [x]  Patient  Verified  Payor: YAMINI / Plan: Samm Baldwin 76 / Product Type:  /    In time:11;32m  Out time:12:28pm  Total Treatment Time (min): 56  Total Timed Codes (min): 53  1:1 Treatment Time (MC/BCBS only): N/A   Visit #: 11 of 15    Treatment Dx: Left knee pain [M25.562]    SUBJECTIVE  Pain Level (0-10 scale): 7  Any medication changes, allergies to medications, adverse drug reactions, diagnosis change, or new procedure performed?: [x] No    [] Yes (see summary sheet for update)  Subjective functional status/changes:   [] No changes reported  Pt continues to plan on having her knee scope in the near future.      OBJECTIVE    Modalities Rationale:     decrease edema, decrease inflammation and decrease pain to improve patient's ability to perform daily activities with decreased pain and symptom levels     min [] Estim, type/location:                                      []  att     []  unatt     []  w/US     []  w/ice    []  w/heat    min []  Mechanical Traction: type/lbs                   []  pro   []  sup   []  int   []  cont    []  before manual    []  after manual    min []  Ultrasound, settings/location:      min []  Iontophoresis w/ dexamethasone, location:                                               []  take home patch       []  in clinic    min []  Ice     []  Heat    location/position:    10 min [x]  Vasopneumatic Device, press/temp: Left knee - LE elevated   If using vaso (only need to measure limb vaso being performed on)      pre-treatment girth : 41.5 cm       post-treatment girth : 41.0 cm       measured at (landmark location) :  Mid- patella     min []  Other:    [] Skin assessment post-treatment (if applicable):    []  intact    []  redness- no adverse reaction                  []redness  adverse reaction:        27 min Therapeutic Exercise:  [x] See flow sheet :   Rationale: increase ROM, increase strength and improve coordination to improve the patients ability to perform daily activities with decreased pain and symptom levels     16 min Neuromuscular Re-education:  [x]  See flow sheet :   Rationale: increase ROM, increase strength, improve coordination, improve balance and increase proprioception  to improve the patients ability to perform daily activities with decreased pain and symptom levels    3 (NC) min Manual Therapy:  KT application to the left knee - decrease lateral patella glide and increase quad activation    Rationale: decrease pain, increase ROM and increase tissue extensibility to improve the patients ability to perform daily activities with decreased pain and symptom levels    The manual therapy interventions were performed at a separate and distinct time from the therapeutic activities interventions. With   [] TE   [] TA   [] neuro   [] other: Patient Education: [x] Review HEP    [] Progressed/Changed HEP based on:   [] positioning   [] body mechanics   [] transfers   [] heat/ice application    [] other:      Other Objective/Functional Measures: updated LTR goal      Pain Level (0-10 scale) post treatment: 5    ASSESSMENT/Changes in Function: The pt reported to therapy with a pleasant attitude and ready to participate in therapeutic exercises. Patient tolerated treatment session well today. Patient had no complaints but was challenged with addition of WILLIAN standing exercises per flow sheet to exercise program. Pt required multiple tactile cues, verbal cues, and demo for all new exercises to complete with proper body mechanics. Patient continues to make slow progress toward goals and would benefit from continued skilled PT intervention to address remaining deficits outlined in goals below.       Patient will continue to benefit from skilled PT services to modify and progress therapeutic interventions, address functional mobility deficits, address ROM deficits, address strength deficits, analyze and address soft tissue restrictions, analyze and cue movement patterns, analyze and modify body mechanics/ergonomics, assess and modify postural abnormalities and address imbalance/dizziness to attain remaining goals. [x]  See Plan of Care  []  See progress note/recertification  []  See Discharge Summary         Progress towards goals / Updated goals:  Short Term Goals:   To be accomplished in 2 weeks: 1. Patient will report compliance with HEP 1x/day to aid in rehabilitation program.  Status at IE: Provided pt with HEP and pt appeared to understand. Last PN:pt reports compliance, except for yesterday progressing 3/11/22-goal to be met in 4 more weeks     2.Patient will rate pain on greater than 8/10 so pt can perform ADL's. Status at IE:pt rates worst pain 10/10, least pain 10/10, current pain 10/10  Last PN: 3/11/22 worst pain in the last 48 hrs 5/10, least pain 3/10-gaol to be met in 4 more weeks     Long Term Goals: To be accomplished in 8 weeks: 1. Patient will increase limited Bilateral LE strength by at least . 5 grade MMT throughout so pt can perform stairs.   Status at IE:    Left (0-5) Right (0-5) N/T   Knee extension 5- 5 []????????????    Knee flexion 5 5 []????????????    Ankle Dorsiflexion (L4) 4+ 4+ []????????????    Ankle Plantarflexion (S1)  #heel raises     [x]????????????    Hip flexion 5- 5- []????????????    Hip abduction 4 4 []????????????    Hip ADDuction     [x]????????????    Hip ER 4- 4 []????????????    Hip IR 4- 4 []????????????    Hip extension 3+ 4- []????????????    Glute Max 3+ 3+ []????????????    Glute Med 4- 4- []????????????       Last PN: 3/11/22-part'l MET-goal to be met in 4 more weeks     Left (0-5) Right (0-5) N/T   Knee extension 5 5 []??????    Knee flexion 5 5 []??????    Ankle Dorsiflexion (L4) 4+ 4+ []??????    Ankle Plantarflexion (S1)  #heel raises     [x]??????    Hip flexion 5- 5- []??????    Hip abduction 4 4 []??????    Hip ADDuction     [x]??????    Hip ER 4 4+ []??????    Hip IR 4- 4 []??????    Hip extension 3+ 4- []??????    Glute Max 3+ 3+ []??????    Glute Med 4- 4- []??????          2. Patient will report pain no greater than 6/10 throughout entire day to aid in completion of ADLs. Status at IE:see STG  Last PN: 3/11/22-worst pain in the last 48 hrs 5/10, least pain 3/10-gaol to be met in 4 more weeks        3. Patient will have pain free full knee flexion; increase limited ankle and hip ROM by 5 degrees so pt can  objects off the ground. Status at IE:              Knee Left Right   Extension Lacking 2  0   Flexion 142 (pain at end range and pain with returning to neutral) 141                    Hip Left Right   Flexion 75 82                                                     Ankle Left Right   Dorsiflexion 4 (pain in the knee) 0   Last PN: 3/11/22-slow progression-goal to be met in 4 more weeks                                                              Hip PROM Left Right   Flexion 75 82    hip ER: Left: 30 right: 25  Hip IR: left: 30 ( knee pain)  Right: 30                                       4. Patient will improve FOTO score to 79 points to demonstrate improvement in functional status. Status at IE:77  Last PN :3/11/22: taken on 3/8/22 70 regression, but she answered initially with the mind set of \"no pain no gain\"- goal to be met in 4 more weeks  Current 3/21/22:  69 - REGRESSION ( pt reported that she has improved and this regression does not accurately represent her change in status)      *FOTO score is an established functional score where 100 = no disability*     5.Pt will have 2 inch improvement in LTR so pt can have the mobility in the lumbar spine to perform a proper squat to be able to pick objects off the ground. Status at IE: LTR: right: 18 increases; Left: 18.5 inches   Last PN: 3/11/22 LTR: right: 17 increases;  Left: 17.5 [id number] to be met in 4 more weeks  Current: 18in right, 17in left post session progressing 3/18/22   Current 3/23/22: right = 17 in, left = 17.5 in - progressing         6. Pt will perform proper squat with femurs close to parallel to the ground, minimal anterior tibial translation, weight posteriorly onto heels, no heel lift, no genu valgus, good posterior pelvic tilt.   Status at IE: Wide base of support, no lumbar flexion, anterior tibial translation, heel lift  Last PN: 3/11/22 Wide base of support, no lumbar flexion, anterior tibial translation, heel lift; with verbal cuing cuing less anterior tibial translation, but performed more of a mini squat-goal to be met in 4 more weeks       PLAN  []  Upgrade activities as tolerated     [x]  Continue plan of care  []  Update interventions per flow sheet       []  Discharge due to:_  []  Other:_      Refugio Cooper, PT 3/23/2022  12:05 PM    Future Appointments   Date Time Provider Amanda Al   4/5/2022 10:45 AM [facility]   4/8/2022  1:00 PM Jarett Rankin [facility]   4/12/2022 12:45 PM [facility]   4/15/2022 12:45 PM Arnav Sosa [facility]   4/18/2022  1:45 PM JAVIER [facility] AMB   4/19/2022 12:45 PM Arnav Sosa PT [facility]   5/16/2022  1:00 PM JAVIER [facility] AM[facility]

## 2022-04-05 ENCOUNTER — HOSPITAL ENCOUNTER (OUTPATIENT)
Dept: PHYSICAL THERAPY | Age: 47
Discharge: HOME | End: 2022-04-05
Payer: OTHER GOVERNMENT

## 2022-04-05 PROCEDURE — 97112 NEUROMUSCULAR REEDUCATION: CPT

## 2022-04-05 PROCEDURE — 97530 THERAPEUTIC ACTIVITIES: CPT

## 2022-04-05 PROCEDURE — 97110 THERAPEUTIC EXERCISES: CPT

## 2022-04-05 NOTE — PROGRESS NOTES
PT DAILY TREATMENT NOTE    Patient Name: Janene Puente  Date:2022  : 1975  [x]  Patient  Verified  Payor:  / Plan: Samm Baldwin 76 / Product Type:  /    In time:10:50  Out time:11:41  Total Treatment Time (min): 51  Total Timed Codes (min): 51  1:1 Treatment Time (MC/BCBS only): 46   Visit #: 12 of 15    Treatment Dx: Left knee pain [M25.562]    SUBJECTIVE  Pain Level (0-10 scale): 5/10  Any medication changes, allergies to medications, adverse drug reactions, diagnosis change, or new procedure performed?: [x] No    [] Yes (see summary sheet for update)  Subjective functional status/changes:   [] No changes reported  Pt reports that when she takes off the tape the knee swells more. OBJECTIVE        8 min Therapeutic Exercise:  [] See flow sheet :   Rationale: increase ROM, increase strength, improve coordination, improve balance and increase proprioception to improve the patients ability to perform daily activities with decreased pain and symptom levels      15 min Therapeutic Activity:  []  See flow sheet :   Rationale: increase ROM, increase strength, improve coordination, improve balance and increase proprioception  to improve the patients ability to perform daily activities with decreased pain and symptom levels       38 min Neuromuscular Re-education:  []  See flow sheet : KT taping for medial patellar glide   Rationale: increase ROM, increase strength, improve coordination, improve balance and increase proprioception  to improve the patients ability to perform daily activities with decreased pain and symptom levels            With   [x] TE   [x] TA   [x] neuro   [] other: Patient Education: [x] Review HEP    [] Progressed/Changed HEP based on:   [x] positioning   [x] body mechanics   [x] transfers   [] heat/ice application    [] other:      Other Objective/Functional Measures: Pt enters gym in no apparent distress.         Pretest/Post  Adductor Drop Test: right: positive/neg    left: positive/midline  Lower trunk rotation (inches): Right: 18.5/17 left:18/16   Hruska ADDuctor lift test (Score 0-5): Right: 1/2 Left:2/2      Pain Level (0-10 scale) post treatment: 3/10     ASSESSMENT/Changes in Function: Patient tolerated therapy session well as there were no adverse reactions today. Pt continues to present with PEC and decreased lower trunk rotation. Pt did have inc after techniques. Pt also improved in Adductor lift score on the right, but still unable to lift hips. Attempted to perform \"lady in glasses\" with right hip abduction, but pt noted to loose PPT and use the lower back muscles for activation of right LE, so held this therapy session. Pt feeling fatigued with cross over glut. Pt is progressing with therapy as indicated by pt tolerating increase in exercise repetitions and resistance. Although showing progress patient would benefit from continuation of skilled physical therapy to address the remaining limitations. Patient will continue to benefit from skilled PT services to modify and progress therapeutic interventions, address functional mobility deficits, address ROM deficits, address strength deficits, analyze and address soft tissue restrictions, analyze and cue movement patterns, analyze and modify body mechanics/ergonomics, assess and modify postural abnormalities and instruct in home and community integration to attain remaining goals. [x]  See Plan of Care  []  See progress note/recertification  []  See Discharge Summary         Progress towards goals / Updated goals:  Short Term Goals:   To be accomplished in 2 weeks: 1. Patient will report compliance with HEP 1x/day to aid in rehabilitation program.  Status at IE: Provided pt with HEP and pt appeared to understand. Last PN:pt reports compliance, except for yesterday progressing 3/11/22-goal to be met in 4 more weeks  Current:     2. Patient will rate pain on greater than 8/10 so pt can perform ADL's.  Status at IE:pt rates worst pain 10/10, least pain 10/10, current pain 10/10  Last PN: 3/11/22 worst pain in the last 48 hrs 5/10, least pain 3/10-gaol to be met in 4 more weeks  Current: 4/5/22 rates pain 5/10, ended with 3/10     Long Term Goals: To be accomplished in 8 weeks: 1. Patient will increase limited Bilateral LE strength by at least . 5 grade MMT throughout so pt can perform stairs. Status at IE:    Left (0-5) Right (0-5) N/T   Knee extension 5- 5 []?????????????    Knee flexion 5 5 []?????????????    Ankle Dorsiflexion (L4) 4+ 4+ []?????????????    Ankle Plantarflexion (S1)  #heel raises     [x]?????????????    Hip flexion 5- 5- []?????????????    Hip abduction 4 4 []?????????????    Hip ADDuction     [x]?????????????    Hip ER 4- 4 []?????????????    Hip IR 4- 4 []?????????????    Hip extension 3+ 4- []?????????????    Glute Max 3+ 3+ []?????????????    Glute Med 4- 4- []?????????????       Last PN: 3/11/22-part'l MET-goal to be met in 4 more weeks     Left (0-5) Right (0-5) N/T   Knee extension 5 5 []???????    Knee flexion 5 5 []???????    Ankle Dorsiflexion (L4) 4+ 4+ []???????    Ankle Plantarflexion (S1)  #heel raises     [x]???????    Hip flexion 5- 5- []???????    Hip abduction 4 4 []???????    Hip ADDuction     [x]???????    Hip ER 4 4+ []???????    Hip IR 4- 4 []???????    Hip extension 3+ 4- []???????    Glute Max 3+ 3+ []???????    Glute Med 4- 4- []???????    Current:       2. Patient will report pain no greater than 6/10 throughout entire day to aid in completion of ADLs. Status at IE:see STG  Last PN: 3/11/22-worst pain in the last 48 hrs 5/10, least pain 3/10-gaol to be met in 4 more weeks   Current: 4/5/22 rates pain 5/10, ended with 3/10     3. Patient will have pain free full knee flexion; increase limited ankle and hip ROM by 5 degrees so pt can  objects off the ground.   Status at IE:              Knee Left Right   Extension Lacking 2  0   Flexion 142 (pain at end range and pain with returning to neutral) 141                    Hip Left Right   Flexion 75 82                                                     Ankle Left Right   Dorsiflexion 4 (pain in the knee) 0   Last PN: 3/11/22-slow progression-goal to be met in 4 more weeks                                                              Hip PROM Left Right   Flexion 75 82    hip ER: Left: 30 right: 25  Hip IR: left: 30 ( knee pain)  Right: 30   Current:                                       4. Patient will improve FOTO score to 79 points to demonstrate improvement in functional status. Status at IE:77  Last PN :3/11/22: taken on 3/8/22 70 regression, but she answered initially with the mind set of \"no pain no gain\"- goal to be met in 4 more weeks  Current 3/21/22:  30 - REGRESSION ( pt reported that she has improved and this regression does not accurately represent her change in status)      *FOTO score is an established functional score where 100 = no disability*     5.Pt will have 2 inch improvement in LTR so pt can have the mobility in the lumbar spine to perform a proper squat to be able to pick objects off the ground. Status at IE: LTR: right: 18 increases; Left: 18.5 inches   Last PN: 3/11/22 LTR: right: 17 increases; Left: 17.5 [id number] to be met in 4 more weeks  Current:4/5/22 Lower trunk rotation (inches): Right: 18.5/17 left:18/16         6. Pt will perform proper squat with femurs close to parallel to the ground, minimal anterior tibial translation, weight posteriorly onto heels, no heel lift, no genu valgus, good posterior pelvic tilt.   Status at IE: Wide base of support, no lumbar flexion, anterior tibial translation, heel lift  Last PN: 3/11/22 Wide base of support, no lumbar flexion, anterior tibial translation, heel lift; with verbal cuing cuing less anterior tibial translation, but performed more of a mini squat-goal to be met in 4 more weeks  Current:       PLAN  []  Upgrade activities as tolerated     [x]  Continue plan of care  []  Update interventions per flow sheet       []  Discharge due to:_  []  Other:_      Samantha Sage, PT, DPT, [facility] 4/5/2022  9:04 AM    Future Appointments   Date Time Provider [place] Al   4/5/2022 10:45 AM Tomas Decree, PT [facility]   4/8/2022  1:00 PM Johnathan Rankin [facility]   4/12/2022 12:45 PM Tomas Decree, PT [facility]   4/15/2022 12:45 PM Tomas Decree, PT [facility]   4/18/2022  1:45 PM Cloyce Sicard, PA [facility] BS AMB   4/19/2022 12:45 PM Tomas Decree, PT [facility]   5/16/2022  1:00 PM Brandon Gray PA [facility] BS AMB

## 2022-04-08 ENCOUNTER — HOSPITAL ENCOUNTER (OUTPATIENT)
Dept: PHYSICAL THERAPY | Age: 47
Discharge: HOME | End: 2022-04-08
Payer: OTHER GOVERNMENT

## 2022-04-08 PROCEDURE — 97112 NEUROMUSCULAR REEDUCATION: CPT

## 2022-04-08 PROCEDURE — 97110 THERAPEUTIC EXERCISES: CPT

## 2022-04-08 PROCEDURE — 97530 THERAPEUTIC ACTIVITIES: CPT

## 2022-04-08 NOTE — PROGRESS NOTES
PT DAILY TREATMENT NOTE    Patient Name: Arianna Granados  Date:2022  : 1975  [x]  Patient  Verified  Payor: YAMINI / Plan: Samm Baldwin 76 / Product Type:  /    In time:1:03  Out time:2:00  Total Treatment Time (min): 57  Total Timed Codes (min): 57  1:1 Treatment Time (MC/BCBS only): 62   Visit #: 13 of 15    Treatment Dx: Left knee pain [M25.562]    SUBJECTIVE  Pain Level (0-10 scale): 5  Any medication changes, allergies to medications, adverse drug reactions, diagnosis change, or new procedure performed?: [x] No    [] Yes (see summary sheet for update)  Subjective functional status/changes:   [] No changes reported  \"My knee is swollen since I took the tape off. \"    OBJECTIVE    10 min Therapeutic Exercise:  [x] See flow sheet :   Rationale: increase ROM and increase strength to improve the patients ability to perform daily activities with decreased pain and symptom levels    17 min Therapeutic Activity:  [x]  See flow sheet :   Rationale: increase strength, improve coordination and increase proprioception  to improve the patients ability to perform daily activities with decreased pain and symptom levels     30 min Neuromuscular Re-education:  [x]  See flow sheet : k- tape to left knee   Rationale: increase strength, improve coordination, improve balance and increase proprioceptionto improve the patients ability to perform daily activities with decreased pain and symptom levels    With   [] TE   [] TA   [] neuro   [] other: Patient Education: [x] Review HEP    [] Progressed/Changed HEP based on:   [] positioning   [] body mechanics   [] transfers   [] heat/ice application    [] other:      Other Objective/Functional Measures:   WILLIAN Special Tests (pre/post)  HGIR:                                                 Right: 90*/             Left: 90/  Hip Add Drop Test:                           Right: +/midline            Left:+/midline  Trunk Rot Right: 18in/17in    Left 18in /17in      Pain Level (0-10 scale) post treatment: 4    ASSESSMENT/Changes in Function: Pt continues to respond well to repositioning exercises however still challenged with decreasing lumbar compensation. Very challenged with s/l glut crossover and left glut med due to fatigue. Pt reporting decreased neck pain with standing right reach with TB. Improved recruitment of diaphragm with balloon today with ability to fully inflate in 4-5 breaths. Patient will continue to benefit from skilled PT services to modify and progress therapeutic interventions, address functional mobility deficits, address ROM deficits, address strength deficits, analyze and cue movement patterns, analyze and modify body mechanics/ergonomics, assess and modify postural abnormalities and instruct in home and community integration to attain remaining goals. [x]  See Plan of Care  []  See progress note/recertification  []  See Discharge Summary         Progress towards goals / Updated goals:  Short Term Goals:   To be accomplished in 2 weeks: 1. Patient will report compliance with HEP 1x/day to aid in rehabilitation program.  Status at IE: Provided pt with HEP and pt appeared to understand. Last PN:pt reports compliance, except for yesterday progressing 3/11/22-goal to be met in 4 more weeks  Current:     2. Patient will rate pain on greater than 8/10 so pt can perform ADL's. Status at IE:pt rates worst pain 10/10, least pain 10/10, current pain 10/10  Last PN: 3/11/22 worst pain in the last 48 hrs 5/10, least pain 3/10-gaol to be met in 4 more weeks  Current: 4/5/22 rates pain 5/10, ended with 3/10     Long Term Goals: To be accomplished in 8 weeks: 1. Patient will increase limited Bilateral LE strength by at least . 5 grade MMT throughout so pt can perform stairs.   Status at IE:    Left (0-5) Right (0-5) N/T   Knee extension 5- 5 []??????????????    Knee flexion 5 5 []??????????????    Ankle Dorsiflexion (L4) 4+ 4+ []??????????????    Ankle Plantarflexion (S1)  #heel raises     [x]??????????????    Hip flexion 5- 5- []??????????????    Hip abduction 4 4 []??????????????    Hip ADDuction     [x]??????????????    Hip ER 4- 4 []??????????????    Hip IR 4- 4 []??????????????    Hip extension 3+ 4- []??????????????    Glute Max 3+ 3+ []??????????????    Glute Med 4- 4- []??????????????       Last PN: 3/11/22-part'l MET-goal to be met in 4 more weeks     Left (0-5) Right (0-5) N/T   Knee extension 5 5 []????????    Knee flexion 5 5 []????????    Ankle Dorsiflexion (L4) 4+ 4+ []????????    Ankle Plantarflexion (S1)  #heel raises     [x]????????    Hip flexion 5- 5- []????????    Hip abduction 4 4 []????????    Hip ADDuction     [x]????????    Hip ER 4 4+ []????????    Hip IR 4- 4 []????????    Hip extension 3+ 4- []????????    Glute Max 3+ 3+ []????????    Glute Med 4- 4- []????????    Current:       2. Patient will report pain no greater than 6/10 throughout entire day to aid in completion of ADLs. Status at IE:see STG  Last PN: 3/11/22-worst pain in the last 48 hrs 5/10, least pain 3/10-gaol to be met in 4 more weeks   Current: 4/5/22 rates pain 5/10, ended with 3/10     3. Patient will have pain free full knee flexion; increase limited ankle and hip ROM by 5 degrees so pt can  objects off the ground. Status at IE:              Knee Left Right   Extension Lacking 2  0   Flexion 142 (pain at end range and pain with returning to neutral) 141                    Hip Left Right   Flexion 75 82                                                     Ankle Left Right   Dorsiflexion 4 (pain in the knee) 0   Last PN: 3/11/22-slow progression-goal to be met in 4 more weeks                                                              Hip PROM Left Right   Flexion 75 82    hip ER: Left: 30 right: 25  Hip IR: left: 30 ( knee pain)  Right: 30   Current:                                        4. Patient will improve FOTO score to 79 points to demonstrate improvement in functional status. Status at IE:77  Last PN :3/11/22: taken on 3/8/22 70 regression, but she answered initially with the mind set of \"no pain no gain\"- goal to be met in 4 more weeks  Current 3/21/22:  61 - REGRESSION ( pt reported that she has improved and this regression does not accurately represent her change in status)      *FOTO score is an established functional score where 100 = no disability*     5.Pt will have 2 inch improvement in LTR so pt can have the mobility in the lumbar spine to perform a proper squat to be able to pick objects off the ground. Status at IE: LTR: right: 18 increases; Left: 18.5 inches   Last PN: 3/11/22 LTR: right: 17 increases; Left: 17.5 [id number] to be met in 4 more weeks  Current:18in bilaterally entering cliinc, 17in leaving slow progress 4/8/22        6. Pt will perform proper squat with femurs close to parallel to the ground, minimal anterior tibial translation, weight posteriorly onto heels, no heel lift, no genu valgus, good posterior pelvic tilt.   Status at IE: Wide base of support, no lumbar flexion, anterior tibial translation, heel lift  Last PN: 3/11/22 Wide base of support, no lumbar flexion, anterior tibial translation, heel lift; with verbal cuing cuing less anterior tibial translation, but performed more of a mini squat-goal to be met in 4 more weeks  Current:     PLAN  []  Upgrade activities as tolerated     [x]  Continue plan of care  []  Update interventions per flow sheet       []  Discharge due to:_  []  Other:_      Kate Mello [facility] 4/8/2022  1:07 PM    Future Appointments   Date Time Provider [place] Al   4/12/2022 12:45 PM Pamalee Fee, PT [facility]   4/15/2022 12:45 PM Pamalee Fee, PT [facility]   4/18/2022  1:45 PM JAVIER Barahona [facility] BS AMB   4/19/2022 12:45 PM Pamalee Fee, PT [facility]   5/16/2022  1:00 PM JAVIER Carter [facility] BS AMB

## 2022-04-12 ENCOUNTER — HOSPITAL ENCOUNTER (OUTPATIENT)
Dept: PHYSICAL THERAPY | Age: 47
Discharge: HOME | End: 2022-04-12
Payer: OTHER GOVERNMENT

## 2022-04-12 PROCEDURE — 97110 THERAPEUTIC EXERCISES: CPT

## 2022-04-12 PROCEDURE — 97112 NEUROMUSCULAR REEDUCATION: CPT

## 2022-04-12 PROCEDURE — 97530 THERAPEUTIC ACTIVITIES: CPT

## 2022-04-12 NOTE — PROGRESS NOTES
[facility] at the [address], [place], [address] [facility]  Phone: [phone]      Fax:  [phone]              Discharge Summary  Patient name: [facility] of Care: 2022   Referral source: Jamaica Anglin : 1975               Medical Diagnosis: Left knee pain [M25.562]    Onset [id number], inc pain                Treatment Diagnosis: left knee pain   Prior Hospitalization: see medical history Provider#: [id number]   Medications: Verified on Patient summary List    Co-morbidities: PMHx/Surgical Hx: []? ??DM []??? HTN (controlled) [x]??? High cholesterol (controlled) []??? Cancer [x]? ? ? Other back pain, hip pain, knee pain, bilateral plantar fascitis, neck pain  Prior Level of Function:  functionally independent, no AD, pain in the knee  Social/Recreation/Work: Work Hx: retired  Recreational Activities: raising two small kids-[employer]     Visits from Start of Care: 14    Missed Visits: 2    Reporting Period : 22 to 22    Goals/Measure of Progress:  Short Term Goals:   To be accomplished in 2 weeks: 1. Patient will report compliance with HEP 1x/day to aid in rehabilitation program.  Status at IE: Provided pt with HEP and pt appeared to understand. Last PN:pt reports compliance, except for yesterday progressing 3/11/22-goal to be met in 4 more weeks  Current: 22 reports compliant 1x/day, updated per chart-goal met     2. Patient will rate pain on greater than 8/10 so pt can perform ADL's. Status at IE:pt rates worst pain 10/10, least pain 10/10, current pain 10/10  Last PN: 3/11/22 worst pain in the last 48 hrs 5/10, least pain 3/10-gaol to be met in 4 more weeks  Current: 22 rates pain 3/10, ended with 3/10, worst pain 10/10-goal not met     Long Term Goals: To be accomplished in 8 weeks: 1. Patient will increase limited Bilateral LE strength by at least . 5 grade MMT throughout so pt can perform stairs.   Status at IE:    Left (0-5) Right (0-5) N/T   Knee extension 5- 5 []????????????????    Knee flexion 5 5 []????????????????    Ankle Dorsiflexion (L4) 4+ 4+ []????????????????    Ankle Plantarflexion (S1)  #heel raises     [x]????????????????    Hip flexion 5- 5- []????????????????    Hip abduction 4 4 []????????????????    Hip ADDuction     [x]????????????????    Hip ER 4- 4 []????????????????    Hip IR 4- 4 []????????????????    Hip extension 3+ 4- []????????????????    Glute Max 3+ 3+ []????????????????    Glute Med 4- 4- []????????????????       Last PN: 3/11/22-part'l MET-goal to be met in 4 more weeks     Left (0-5) Right (0-5) N/T   Knee extension 5 5 []??????????    Knee flexion 5 5 []??????????    Ankle Dorsiflexion (L4) 4+ 4+ []??????????    Ankle Plantarflexion (S1)  #heel raises     [x]??????????    Hip flexion 5- 5- []??????????    Hip abduction 4 4 []??????????    Hip ADDuction     [x]??????????    Hip ER 4 4+ []??????????    Hip IR 4- 4 []??????????    Hip extension 3+ 4- []??????????    Glute Max 3+ 3+ []??????????    Glute Med 4- 4- []??????????    Current:  4/12/22 -part'l MET    Left (0-5) Right (0-5) N/T   Knee extension 5 5 []? ??    Knee flexion 5 5 []? ??    Ankle Dorsiflexion (L4) 5 5 []? ??    Ankle Plantarflexion (S1)  #heel raises x10   x5 []???    Hip flexion 5- 5- []???    Hip abduction 4 4 []? ??    Hip ADDuction     [x]? ??    Hip ER 4 4+ []???    Hip IR 4 4 []? ??    Hip extension 4- 4- []???    Glute Max 3+ 3+ []???    Glute Med 4- 4- []???          2. Patient will report pain no greater than 6/10 throughout entire day to aid in completion of ADLs. Status at IE:see STG  Last PN: 3/11/22-worst pain in the last 48 hrs 5/10, least pain 3/10-gaol to be met in 4 more weeks   Current: 4/12/22 rates pain 3/10, ended with 3/10, worst pain 10/10-goal not met     3.  Patient will have pain free full knee flexion; increase limited ankle and hip ROM by 5 degrees so pt can  objects off the ground. Status at IE:              Knee Left Right   Extension Lacking 2  0   Flexion 142 (pain at end range and pain with returning to neutral) 141                    Hip Left Right   Flexion 75 82                                                     Ankle Left Right   Dorsiflexion 4 (pain in the knee) 0   Last PN: 3/11/22-slow progression-goal to be met in 4 more weeks                                                              Hip PROM Left Right   Flexion 75 82    hip ER: Left: 30 right: 25  Hip IR: left: 30 ( knee pain)  Right: 30   Current: 4/12/22-part'l MET                                                               Hip PROM Left Right   Flexion 75 82    hip ER: Left: 37 right: 37  Hip IR: left: 37  Right: 40                                4. Patient will improve FOTO score to 79 points to demonstrate improvement in functional status. Status at IE:77  Last PN :3/11/22: taken on 3/8/22 70 regression, but she answered initially with the mind set of \"no pain no gain\"- goal to be met in 4 more weeks  Current 4/12/22:  85 -goal MET  *FOTO score is an established functional score where 100 = no disability*     5.Pt will have 2 inch improvement in LTR so pt can have the mobility in the lumbar spine to perform a proper squat to be able to pick objects off the ground. Status at IE: LTR: right: 18 increases; Left: 18.5 inches   Last PN: 3/11/22 LTR: right: 17 increases; Left: 17.5 [id number]-ibni to be met in 4 more weeks  Current:4/12/22 LTR: right: 16 increases; Left: 17 inches-part'l MET     6. Pt will perform proper squat with femurs close to parallel to the ground, minimal anterior tibial translation, weight posteriorly onto heels, no heel lift, no genu valgus, good posterior pelvic tilt.   Status at IE: Wide base of support, no lumbar flexion, anterior tibial translation, heel lift  Last PN: 3/11/22 Wide base of support, no lumbar flexion, anterior tibial translation, heel lift; with verbal cuing cuing less anterior tibial translation, but performed more of a mini squat-goal to be met in 4 more weeks  Current:  4/12/22 Wide base of support, no lumbar flexion, anterior tibial translation, with verbal and tactile cuing less anterior tibial translation, but performed more of a mini squat      Assessment/ Summary of Care: Lisa Flood a 41 y.o.  yo female who [facility] to [facility] diagnosed with patellofemoral disorder of left knee. Patient is s/p fall TB 3366 with inc pain in 2003. Pt has had intermittent pain and locking since then, but just now getting treatment. Per MRI reports pt with left lateral meniscal tear. This is patients 14th visit including evaluation on 2/14/22 and 7th visit since last PN on 4/11/22. Pt has progressed with therapy as pt with inc in certain MMT and hip ROM. Although pt progressing, pt continues to have pain, rating worst pain in the last 48 hrs 10/10. Pt continues to have dec in LE MMT and LE ROM. Pt also with impaired mechanics with squatting as pt with a tendency to shift weight forward. Pt also continues to have bilateral adduction drop test indicating lumbo-pelvic femoral dysfunction. Pt is scheduled to have surgery on 5/2/22 and only authorized one more visit. Pt wishing to transition to HEP until surgery.  It is recommended that pt continues with HEP, be d/c'd from skilled PT, and remain under care of MD.    RECOMMENDATIONS:  [x]Discontinue therapy: [x]Patient has reached or is progressing toward set goals, pt request      []Patient is non-compliant or has abdicated      []Due to lack of appreciable progress towards set goals    Mynor Mcmillan, PT, DPT, [facility] 4/12/2022 12:44 PM

## 2022-04-12 NOTE — PROGRESS NOTES
PT DAILY TREATMENT NOTE    Patient Name: Jeffery Griffith  Date:2022  : 1975  [x]  Patient  Verified  Payor:  / Plan: [facility]  [facility] REGION / Product Type:  /    In time:12:46  Out time:1:34  Total Treatment Time (min): 48  Total Timed Codes (min): 48  1:1 Treatment Time (MC/BCBS only): 48   Visit #: 14 of 15    Treatment Dx: Left knee pain [M25.562]    SUBJECTIVE  Pain Level (0-10 scale): 3/10  Any medication changes, allergies to medications, adverse drug reactions, diagnosis change, or new procedure performed?: [x] No    [] Yes (see summary sheet for update)  Subjective functional status/changes:   [] No changes reported  Pt reports that this morning she was having shooting pain and felt like it was going to give way. Reports she doesn't know if she slept wrong. Reports that the worst pain is 10/10. Reports that she is compliant with HEP 1x/day. Reports that she is ok with stairs as long as she goes up with the right and goes up one step at a time. Reports that she is scheduled for surgery 22.      OBJECTIVE      15 min Therapeutic Exercise:  [] See flow sheet :   Rationale: increase ROM, increase strength, improve coordination, improve balance and increase proprioception to improve the patients ability to perform daily activities with decreased pain and symptom levels      10 min Therapeutic Activity:  []  See flow sheet :   Rationale: increase ROM, increase strength, improve coordination, improve balance and increase proprioception  to improve the patients ability to perform daily activities with decreased pain and symptom levels       23 min Neuromuscular Re-education:  []  See flow sheet :   Rationale: increase ROM, increase strength, improve coordination, improve balance and increase proprioception  to improve the patients ability to perform daily activities with decreased pain and symptom levels            With   [x] TE   [x] TA   [x] neuro   [] other: Patient Education: [x] Review HEP    [] Progressed/Changed HEP based on:   [] positioning   [] body mechanics   [] transfers   [] heat/ice application    [x] other: post surgical expectations     Other Objective/Functional Measures: Pt enters gym in no apparent distress. Palpation: TTP:  Left: patellar tendon, lateral joint line, lateral femoral condyle, medial patella, medial joint line, medial femoral condyle, medial tibial condyle, increased tonicity in the quad     ADD drop test: positive bilaterally                 LTR: right: 16 increases; Left: 17 inches                                      PROM                                  Hip Left Right   Flexion 75 82    hip ER: Left: 37 right: 37  Hip IR: left: 37  Right: 40                                          Strength:   []? ? Unable to assess at this time     Left (0-5) Right (0-5) N/T   Knee extension 5 5 []? ?    Knee flexion 5 5 []? ?    Ankle Dorsiflexion (L4) 5 5 []? ?    Ankle Plantarflexion (S1)  #heel raises x10   x5 []??    Hip flexion 5- 5- []??    Hip abduction 4 4 []? ?    Hip ADDuction     [x]? ?    Hip ER 4 4+ []??    Hip IR 4 4 []? ?    Hip extension 4- 4- []??    Glute Max 3+ 3+ []? ?    Glute Med 4- 4- []??                Squat:  Wide base of support, no lumbar flexion, anterior tibial translation, with verbal and tactile cuing less anterior tibial translation, but performed more of a mini squat      Pain Level (0-10 scale) post treatment: 3    ASSESSMENT/Changes in Function: Imani Sharif a 41 y.o.  yo female who presents to [facility] diagnosed with patellofemoral disorder of left knee. Patient is s/p fall BD 4008 with inc pain in 2003. Pt has had intermittent pain and locking since then, but just now getting treatment. Per MRI reports pt with left lateral meniscal tear. This is patients 14th visit including evaluation on 2/14/22 and 7th visit since last PN on 4/11/22. Pt has progressed with therapy as pt with inc in certain MMT and hip ROM. Although pt progressing, pt continues to have pain, rating worst pain in the last 48 hrs 10/10. Pt continues to have dec in LE MMT and LE ROM. Pt also with impaired mechanics with squatting as pt with a tendency to shift weight forward. Pt also continues to have bilateral adduction drop test indicating lumbo-pelvic femoral dysfunction. Pt is scheduled to have surgery on 5/2/22 and only authorized one more visit. Pt wishing to transition to HEP until surgery. It is recommended that pt continues with HEP, be d/c'd from skilled PT, and remain under care of MD.        []  See Plan of Care  []  See progress note/recertification  [x]  See Discharge Summary         Progress towards goals / Updated goals:  Short Term Goals:   To be accomplished in 2 weeks: 1. Patient will report compliance with HEP 1x/day to aid in rehabilitation program.  Status at IE: Provided pt with HEP and pt appeared to understand. Last PN:pt reports compliance, except for yesterday progressing 3/11/22-goal to be met in 4 more weeks  Current: 4/12/22 reports compliant 1x/day, updated per chart-goal met     2. Patient will rate pain on greater than 8/10 so pt can perform ADL's. Status at IE:pt rates worst pain 10/10, least pain 10/10, current pain 10/10  Last PN: 3/11/22 worst pain in the last 48 hrs 5/10, least pain 3/10-gaol to be met in 4 more weeks  Current: 4/12/22 rates pain 3/10, ended with 3/10, worst pain 10/10-goal not met     Long Term Goals: To be accomplished in 8 weeks: 1. Patient will increase limited Bilateral LE strength by at least . 5 grade MMT throughout so pt can perform stairs.   Status at IE:    Left (0-5) Right (0-5) N/T   Knee extension 5- 5 []???????????????    Knee flexion 5 5 []???????????????    Ankle Dorsiflexion (L4) 4+ 4+ []???????????????    Ankle Plantarflexion (S1)  #heel raises     [x]???????????????    Hip flexion 5- 5- []???????????????    Hip abduction 4 4 []???????????????    Hip ADDuction     [x]???????????????  Hip ER 4- 4 []???????????????    Hip IR 4- 4 []???????????????    Hip extension 3+ 4- []???????????????    Glute Max 3+ 3+ []???????????????    Glute Med 4- 4- []???????????????       Last PN: 3/11/22-part'l MET-goal to be met in 4 more weeks     Left (0-5) Right (0-5) N/T   Knee extension 5 5 []?????????    Knee flexion 5 5 []?????????    Ankle Dorsiflexion (L4) 4+ 4+ []?????????    Ankle Plantarflexion (S1)  #heel raises     [x]?????????    Hip flexion 5- 5- []?????????    Hip abduction 4 4 []?????????    Hip ADDuction     [x]?????????    Hip ER 4 4+ []?????????    Hip IR 4- 4 []?????????    Hip extension 3+ 4- []?????????    Glute Max 3+ 3+ []?????????    Glute Med 4- 4- []?????????    Current:  4/12/22 -part'l MET    Left (0-5) Right (0-5) N/T   Knee extension 5 5 []? ?    Knee flexion 5 5 []? ?    Ankle Dorsiflexion (L4) 5 5 []? ?    Ankle Plantarflexion (S1)  #heel raises x10   x5 []??    Hip flexion 5- 5- []??    Hip abduction 4 4 []? ?    Hip ADDuction     [x]? ?    Hip ER 4 4+ []??    Hip IR 4 4 []? ?    Hip extension 4- 4- []??    Glute Max 3+ 3+ []? ?    Glute Med 4- 4- []??         2. Patient will report pain no greater than 6/10 throughout entire day to aid in completion of ADLs. Status at IE:see STG  Last PN: 3/11/22-worst pain in the last 48 hrs 5/10, least pain 3/10-gaol to be met in 4 more weeks   Current: 4/12/22 rates pain 3/10, ended with 3/10, worst pain 10/10-goal not met     3. Patient will have pain free full knee flexion; increase limited ankle and hip ROM by 5 degrees so pt can  objects off the ground.   Status at IE:              Knee Left Right   Extension Lacking 2  0   Flexion 142 (pain at end range and pain with returning to neutral) 141                    Hip Left Right   Flexion 75 82                                                     Ankle Left Right   Dorsiflexion 4 (pain in the knee) 0   Last PN: 3/11/22-slow progression-goal to be met in 4 more weeks                                                              Hip PROM Left Right   Flexion 75 82    hip ER: Left: 30 right: 25  Hip IR: left: 30 ( knee pain)  Right: 30   Current: 4/12/22-part'l MET                                                               Hip PROM Left Right   Flexion 75 82    hip ER: Left: 37 right: 37  Hip IR: left: 37  Right: 40                                          4. Patient will improve FOTO score to 79 points to demonstrate improvement in functional status. Status at IE:77  Last PN :3/11/22: taken on 3/8/22 70 regression, but she answered initially with the mind set of \"no pain no gain\"- goal to be met in 4 more weeks  Current 4/12/22:  85 -goal MET  *FOTO score is an established functional score where 100 = no disability*     5.Pt will have 2 inch improvement in LTR so pt can have the mobility in the lumbar spine to perform a proper squat to be able to pick objects off the ground. Status at IE: LTR: right: 18 increases; Left: 18.5 inches   Last PN: 3/11/22 LTR: right: 17 increases; Left: 17.5 [id number]-geez to be met in 4 more weeks  Current:4/12/22 LTR: right: 16 increases; Left: 17 inches-part'l MET          6. Pt will perform proper squat with femurs close to parallel to the ground, minimal anterior tibial translation, weight posteriorly onto heels, no heel lift, no genu valgus, good posterior pelvic tilt.   Status at IE: Wide base of support, no lumbar flexion, anterior tibial translation, heel lift  Last PN: 3/11/22 Wide base of support, no lumbar flexion, anterior tibial translation, heel lift; with verbal cuing cuing less anterior tibial translation, but performed more of a mini squat-goal to be met in 4 more weeks  Current:  4/12/22 Wide base of support, no lumbar flexion, anterior tibial translation, with verbal and tactile cuing less anterior tibial translation, but performed more of a mini squat        PLAN  []  Upgrade activities as tolerated     [] Continue plan of care  []  Update interventions per flow sheet       [x]  Discharge due to:_pt request as pt is scheduled to have surgery  []  Other:_      Lakeshia Spence, PT, DPT, CIMT 4/12/2022  12:42 PM    Future Appointments   Date Time Provider Amanda Al   4/12/2022 12:45 PM [facility]   4/15/2022 12:45 PM JASON Gabriel THE [facility]   4/18/2022  1:45 PM JAVIER Roach [facility] BS AMB   4/19/2022 12:45 PM JASON GabrielHPTBREN THE [facility]   5/16/2022  1:00 PM JAVIER Wallace [facility] BS AMB

## 2022-04-15 ENCOUNTER — APPOINTMENT (OUTPATIENT)
Dept: PHYSICAL THERAPY | Age: 47
End: 2022-04-15
Payer: OTHER GOVERNMENT

## 2022-04-15 NOTE — PATIENT INSTRUCTIONS
Dr. Jossy Duran Knee Arthroscopy Surgery    What is the surgery? - This is an outpatient procedure at either [place] [address] or [address] [place], [place] [address] will be completely asleep for procedure. Dr. Jossy Duran will make 2 small incisions in your knee. He will take a tour of your knee with the camera and then address the meniscal tear(s). We will be able to evaluate if any arthritis in your knee but this surgery is not to treat your arthritis. - Total surgery takes about 25-30 mins     What can you expect after surgery? - You will have a bulky dressing on your knee that you can remove 2 days after surgery. You will be able to shower 2 days after surgery but no soaking in a bath, hot tub, ocean or pool x 2 weeks to allow for full wound healing. No special brace is needed. - You will be on crutches or a walker when you leave the hospital. You can place weight on your leg as tolerated starting immediately. You are usually on crutches or your walker for about 4-5 days.   - Even though you can place weight on your leg, we recommend no walking or standing longer than 10mins for the first week. We will gradually increase your activities after that point.    - Dr. Jossy Duran will start physical therapy for you when you return for your 1 week post op apt  - It will take your 6-8 weeks to fully recover from your surgery. When can I return to work? - Most patients return to desk work only after 1-2 weeks. We recommend no prolonged walking or standing, climbing, kneeling, or crawling x 6-8 weeks. Not all knee arthroscopies are the same. The specifics of your individual case will be discussed at length with you by Dr. Jossy Duran and his Physician Assistant. Michael Falcon  Surgical Coordinator  [address] [place] [address]. [facility].  [address] [place] [address], [address] [email or web address]  P: [phone]  F: [phone]

## 2022-04-18 ENCOUNTER — OFFICE VISIT (OUTPATIENT)
Dept: ORTHOPEDIC SURGERY | Age: 47
End: 2022-04-18

## 2022-04-18 VITALS
WEIGHT: 174 LBS | SYSTOLIC BLOOD PRESSURE: 120 MMHG | OXYGEN SATURATION: 98 % | BODY MASS INDEX: 26.37 KG/M2 | DIASTOLIC BLOOD PRESSURE: 79 MMHG | HEIGHT: 68 IN | HEART RATE: 74 BPM

## 2022-04-18 DIAGNOSIS — S83.282A TEAR OF LATERAL MENISCUS OF LEFT KNEE, CURRENT, UNSPECIFIED TEAR TYPE, INITIAL ENCOUNTER: Primary | ICD-10-CM

## 2022-04-18 RX ORDER — HYDROCODONE BITARTRATE AND ACETAMINOPHEN 7.5; 325 MG/1; MG/1
1 TABLET ORAL
Qty: 40 TABLET | Refills: 0 | Status: SHIPPED | OUTPATIENT
Start: 2022-04-18 | End: 2022-04-25

## 2022-04-18 NOTE — PROGRESS NOTES
HISTORY AND PHYSICAL          Patient: Marilou Rodriguez                MRN: [id number]       SSN: [id number]  YOB: 1975          AGE: 41 y.o. SEX: female      Patient scheduled for:  Left knee arthroscopic partial lateral menisectomy    Surgeon: Susan Damian MD    ANESTHESIA TYPE:  General    HISTORY:     The patient was seen in the office today for a preoperative history and physical for an upcoming above listed surgery. The patient is a pleasant 41 y.o. female who has a history of left knee pain. She rates her pain 5/10 today. Pain has been present for almost 20 years. Pt has been to PT and taken Mobic with minimal relief. She notes a catching and locking sensation with associated throbbing and aching pain. [facility] and has not gotten treatment in the past 10 years. She has pain with climbing stairs. She notes stiffness after staying in one position for too long  Due to the current findings, affected activity of daily living and continued pain and discomfort, surgical intervention is indicated. The alternatives, risks, and complications, including but not limited to infection, blood loss, need for blood transfusion, neurovascular damage, vinod-incisional numbness, subcutaneous hematoma, bone fracture, anesthetic complications, DVT, PE, death, RSD, postoperative stiffness and pain, possible surgical scar, delayed healing and nonhealing, reflexive sympathetic dystrophy, damage to blood vessels and nerves, need for more surgery, MI, and stroke,  failure of hardware, gait disturbances,have been discussed. The patient understands and wishes to proceed with surgery. PAST MEDICAL HISTORY:     No past medical history on file.     CURRENT MEDICATIONS:     Current Outpatient Medications   Medication Sig Dispense Refill    rosuvastatin (CRESTOR) 20 mg tablet TAKE ONE-HALF TABLET BY MOUTH EVERY NIGHT AT BEDTIME -FOR CHOLESTEROL      PreviDent 5000 Plus 1.1 % crea  ergocalciferol (ERGOCALCIFEROL) 1,250 mcg (50,000 unit) capsule Take 50,000 Units by mouth.  diclofenac (VOLTAREN) 1 % gel       meloxicam (MOBIC) 15 mg tablet Take 1 Tablet by mouth daily. 30 Tablet 1       ALLERGIES:     Not on File      SURGICAL HISTORY:     Past Surgical History:   Procedure Laterality Date    HX  SECTION      HX HYSTERECTOMY         SOCIAL HISTORY:     Social History     Socioeconomic History    Marital status:    Tobacco Use    Smoking status: Never Smoker    Smokeless tobacco: Never Used       FAMILY HISTORY:     No family history on file. REVIEW OF SYSTEMS:     Negative for fevers, chills, chest pain, shortness of breath, weight loss, recent illness     General: Negative for fever and chills. No unexpected change in weight. Denies fatigue. No change in appetite. Skin: Negative for rash or itching. HEENT: Negative for congestion, sore throat, neck pain and neck stiffness. No change in vision or hearing. Hasn't noted any enlarged lymph nodes in the neck. Cardiovascular:  Negative for chest pain and palpitations. Has not noted pedal edema. Respiratory: Negative for cough, colds, sinus, hemoptysis, shortness of breath and wheezing. Gastrointestinal: Negative for nausea and vomiting, rectal bleeding, coffee ground emesis, abdominal pain, diarrhea and constipation. Genitourinary: Negative for dysuria, frequency urgency, or burning on micturition. No flank pain, no foul smelling urine, no difficulty with initiating urination. Hematological: Negative for bleeding or easy bruising. Musculoskeletal: Negative  for arthralgias, back pain or neck pain. Neurological: Negative for dizziness, seizures or syncopal episodes. Denies headaches. Endocrine: Denies excessive thirst.  No heat/cold intolerance. Psychiatric: Negative for depression or insomnia. PHYSICAL EXAMINATION:     VITALS: There were no vitals taken for this visit.   GEN:  Well developed, well nourished 41 y.o. female in no acute distress. HEENT: Normocephalic and atraumatic. Eyes: Conjunctivae and EOM are normal.Pupils are equal, round, and reactive to light. External ear normal appearance, external nose normal appearing. Mouth/Throat: Oropharynx is clear and moist, able to handle oral secretions w/out difficulty, airway patent  NECK: Supple. Normal ROM, No lymphadenopathy. Trachea is midline. No bruising, swelling or deformity  RESP: Clear to auscultation bilaterally. No wheezes, rales, rhonchi. Normal effort and breath sounds. No respiratory distress  CARDIO:  Normal rate, regular rhythm and normal heart sounds. No MGR. ABDOMEN: Soft, non-tender, non-distended, normoactive bowel sounds in all four quadrants. There is no tenderness. There is no rebound and no guarding. BACK: No CVA or spinal tenderness  BREAST:  Deferred  PELVIC:    Deferred   RECTAL:  Deferred   :           Deferred  EXTREMITIES: EXAMINATION OF: left knee  Examination Left knee   Skin Intact   Range of motion 0-120   Effusion +   Medial joint line tenderness -   Lateral joint line tenderness +   Tenderness Pes Bursa -   Tenderness insertion MCL -   Tenderness insertion LCL -   Shiras +   Patella crepitus -   Patella grind -   Lachman -   Pivot shift -   Anterior drawer -   Posterior drawer -   Varus stress -   Valgus stress -   Neurovascular Intact   Calf Swelling and Tenderness to Palpation -   Tara's Test -   Hamstring Cord Tightness -       NEUROVASCULAR: Sensation intact to light touch and strength grossly intact and symmetrical. No nystagmus. Positive distal pulses and capillary refill. DVT ASSESSMENT:  There is not  calf tenderness. No evidence of DVT seen on physical exam.  MOTOR: In tact  PSYCH: Alert an oriented to person, place and time.  Mood, memory, affect, behavior and judgment normal       RADIOGRAPHS & DIAGNOSTIC STUDIES:     MRI/xray reveals : MRI of left knee dated 1/17/2022 was reviewed and read by myself reveals:   1. Small free edge radial tear involving the midportion of the midbody of the lateral meniscus   2. Very mild edema in the fat interposed between the intact iliotibial band and the lateral femoral condyle can be seen with mild distal iliotibial band friction syndrome. LABS:     None needed    ASSESSMENT:       Encounter Diagnosis   Name Primary?  Tear of lateral meniscus of left knee, current, unspecified tear type, initial encounter Yes       PLAN:     Again, the alternatives, risks, and complications, as well as expected outcome were discussed. The patient understands and agrees to proceed with left knee arthroscopic partial lateral menisectomy. The patient was counseled at length about the risks of alfonso Covid-19 during their perioperative period and any recovery window from their procedure. The patient was made aware that alfonso Covid-19  may worsen their prognosis for recovering from their procedure and lend to a higher morbidity and/or mortality risk. All material risks, benefits, and reasonable alternatives including postponing the procedure were discussed. The patient does  wish to proceed with the procedure at this time. Patient given orders listed below:    No orders of the defined types were placed in this encounter.         Indio Salomon PA-C  4/18/2022  1:01 PM

## 2022-04-19 ENCOUNTER — APPOINTMENT (OUTPATIENT)
Dept: PHYSICAL THERAPY | Age: 47
End: 2022-04-19
Payer: OTHER GOVERNMENT

## 2022-05-16 ENCOUNTER — OFFICE VISIT (OUTPATIENT)
Dept: ORTHOPEDIC SURGERY | Age: 47
End: 2022-05-16
Payer: OTHER GOVERNMENT

## 2022-05-16 VITALS — BODY MASS INDEX: 26.37 KG/M2 | HEIGHT: 68 IN | HEART RATE: 89 BPM | WEIGHT: 174 LBS | OXYGEN SATURATION: 98 %

## 2022-05-16 DIAGNOSIS — S83.282A TEAR OF LATERAL MENISCUS OF LEFT KNEE, CURRENT, UNSPECIFIED TEAR TYPE, INITIAL ENCOUNTER: Primary | ICD-10-CM

## 2022-05-16 PROCEDURE — 99024 POSTOP FOLLOW-UP VISIT: CPT | Performed by: PHYSICIAN ASSISTANT

## 2022-05-16 NOTE — PROGRESS NOTES
Patient: Isabella Banegas  YOB: 1975       HISTORY:  The patient presents for reevaluation of her left knee status post arthroscopic partial lateral menisectomy on 5/2/22. Patient is improved, states pain is a 0 out of 10.  she has not gone to physical therapy. Patient denies any fever, chills, chest pain, shortness of breath or calf pain. The remainder of the review of systems is negative. There are no new illness or injuries to report since last seen in the office. No changes in medications, allergies, social or family history. PHYSICAL EXAMINATION:    Visit Vitals  Pulse 89   Ht 5' 8\" (1.727 m)   Wt 174 lb (78.9 kg)   SpO2 98%   BMI 26.46 kg/m²     The patient is a well-developed, well-nourished female in no acute distress. The patient is alert and oriented times three. The patient appears to be well groomed. Mood and affect are normal.   ORTHOPEDIC EXAM of Left knee: Inspection: Effusion present,  incisions clean, dry intact, sutures in place  TTP: none  Range of motion: 0-120 flexion  Stability: Stable  Strength: 5/5  2+ distal pulses    IMPRESSION:  Status post Left knee arthroscopic partial lateral menisectomy. PLAN: Incisions cleaned. Surgery was discussed at length today. Stressed to patient that nothing causes an increase in pain or swelling. Patient is weight bearing as tolerated. OK to d/c use of crutches/walker. Will set up with physical therapy. Patient does not request refill of pain medication. Patient will follow up in 4 weeks.     KALLI Langston [facility]

## 2022-05-31 ENCOUNTER — HOSPITAL ENCOUNTER (OUTPATIENT)
Dept: PHYSICAL THERAPY | Age: 47
Discharge: HOME | End: 2022-05-31
Payer: OTHER GOVERNMENT

## 2022-05-31 PROCEDURE — 97530 THERAPEUTIC ACTIVITIES: CPT

## 2022-05-31 PROCEDURE — 97110 THERAPEUTIC EXERCISES: CPT

## 2022-05-31 PROCEDURE — 97162 PT EVAL MOD COMPLEX 30 MIN: CPT

## 2022-05-31 PROCEDURE — 97016 VASOPNEUMATIC DEVICE THERAPY: CPT

## 2022-05-31 NOTE — PROGRESS NOTES
[facility] at the [address] [place], [address] [facility]  Phone: [phone]      Fax:  [phone]             Plan of Care/ Statement of Necessity for Physical Therapy Services      Patient name: Kenn Davila Start of Care: 2022   Referral source: George Valenzuela : 1975    Medical Diagnosis: Left knee pain [M25.562]   Onset Date:DOS: 22    Treatment Diagnosis: left knee pain   Prior Hospitalization: see medical history Provider#: [id number]   Medications: Verified on Patient summary List    Co-morbidities: PMHx/Surgical Hx: []?DM []? HTN (controlled) []? High cholesterol (controlled) []? Cancer [x]? 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 Assessment/ key information:  Kenn Davila is a 41 y.o.  yo female who [facility] to [facility] diagnosed with tear of lateral meniscus of left knee. Patient is s/p left knee arthroscopic partial lateral menisectomy on 22. Patient reports having constant aching throbbing pain at the medial and posterior left knee that increases with standing, walking, bending the knee and limites her ability to sit in [place] [facility] style, walk long distance, chores, and to cross leg to don/doff shoe. Pt has relief with rest, ice, and elevation. Pt rates pain _5__/10 max (in the last 48 hrs) __1_/10 min (in the last 48 hrs) _3__/10 currently at rest  . Patient demonstrates decreased ROM, decreased strength, impaired posture, increased swelling, impaired balance, and decreased functional mobility tolerance.  Pt is tender to palpate and has muscle hypertonicity along the left Medial tibial and femoral condyle, medial and lateral joint line, lateral femoral condyle, patellar tendon, and posterior knee. Pt also presents with impaired squatting techniques as well. Pt with positive special test including positive bilateral adductor drop test, which can indicate lumbo-pelvic femoral dysfunction, which can be a contributing factor. Pt would benefit from skilled physical therapy to address these limitations. Patient will benefit from skilled PT services to modify and progress therapeutic interventions, address functional mobility deficits, address ROM deficits, address strength deficits, analyze and address soft tissue restrictions, analyze and cue movement patterns, analyze and modify body mechanics/ergonomics, assess and modify postural abnormalities, and address imbalance/dizziness to attain remaining goals. [x]  See Plan of Care  []  See progress note/recertification  []  See Discharge Summary    Evaluation Complexity History HIGH Complexity :3+ comorbidities / personal factors will impact the outcome/ POC ; Examination HIGH Complexity : 4+ Standardized tests and measures addressing body structure, function, activity limitation and / or participation in recreation  ;Presentation MEDIUM Complexity : Evolving with changing characteristics  ; Clinical Decision Making MEDIUM Complexity : FOTO score of 26-74 FOTO score = an established functional score where 100 = no disability  Overall Complexity Rating: MEDIUM  Problem List: pain affecting function, decrease ROM, decrease strength, edema affecting function, impaired gait/ balance, decrease ADL/ functional abilitiies, decrease activity tolerance, decrease flexibility/ joint mobility and decrease transfer abilities     Treatment Plan may include any combination of the following: Therapeutic exercise, Therapeutic activities, Neuromuscular re-education, Physical agent/modality, Gait/balance training, Manual therapy, Patient education, Self Care training, Functional mobility training, Home safety training and Stair training  Vasopnuematic compression justification:  Per bilateral girth measures taken and listed above the edema is considered significant and having an impact on the patient's strength, balance, gait, transfers, self care and ADLs  Patient / Family readiness to learn indicated by: asking questions, trying to perform skills and interest  Persons(s) to be included in education: patient (P)  Barriers to Learning/Limitations: None  Patient Goal (s):  \"to be able to get back to walking on the treadmill and going out for long periods of time. \"    Patient Self Reported Health Status: excellent  Rehabilitation Potential: good    Short Term Goals: To be accomplished in 2 weeks: 1. Patient will report compliance with HEP 1x/day to aid in rehabilitation program.  Status at IE: Provided pt with HEP and pt appeared to understand. Current:Same as IE     2. Patient will rate pain on greater than 4/10 so pt can perform ADL's. Status at IE:Pt rates pain _5__/10 max (in the last 48 hrs) __1_/10 min (in the last 48 hrs) _3__/10 currently at rest   Current: Same as IE    Long Term Goals: To be accomplished in 8 weeks: 1. Patient will increase limited Bilateral LE strength by at least . 5 grade MMT throughout so pt can perform stairs. Status at IE:   Left (0-5) Right (0-5) N/T   Knee extension 3- 5 []   Knee flexion 4- 5 []   Ankle Dorsiflexion (L4) 5 5 []   Ankle Plantarflexion (S1)  #heel raises   [x]   Hip flexion 3-  Extensor lag 4 []   Hip abduction 4- 4- []   Hip ER 3 4- []   Hip IR 3 4- []   Hip extension 3- 3+ []   Glute Max 3- 3- []     Current: Same as IE    2. Patient will report pain no greater than 1-2/10 throughout entire day to aid in completion of ADLs. Status at IE:Pt rates pain _5__/10 max (in the last 48 hrs) __1_/10 min (in the last 48 hrs) _3__/10 currently at rest   Current: Same as IE    3.  Patient will 0-140 degrees of right knee ROM so pt can  objects off the ground. Status at IE:   Knee AROM Left Right   Extension Lacking 10 0   Flexion 135 145   Current: Same as IE    4. Patient will improve FOTO score to 71 points to demonstrate improvement in functional status. Status at IE:54  Current: Same as IE    *FOTO score is an established functional score where 100 = no disability*    5. Pt will have 5-10 deg inc in hip ROM so pt can don/doff shoes  Status at IE:   Hip PROM Left Right   Flexion 80 80   ER 25 32   IR 32 30   Current: same as IE    Frequency / Duration: Patient to be seen 2 times per week for 8  Weeks. Patient/ Caregiver education and instruction: Diagnosis, prognosis, self care, activity modification and exercises scar massage, sleeping with knee straight, proper gait   [x]  Plan of care has been reviewed with PTA    Certification Period: n/a  Hailey Berumen PT, DPT, [facility] 5/31/2022 8:28 AM  _____________________________________________________________________  I certify that the above Therapy Services are being furnished while the patient is under my care. I agree with the treatment plan and certify that this therapy is necessary.     [de-identified] Signature:____________Date:_________TIME:________                                      JAVIER Almendarez    ** Signature, Date and Time must be completed for valid certification **    Please sign and return to [facility] at the [address], [facility], [address] [facility]       Phone: [phone]      Fax:  [phone]

## 2022-05-31 NOTE — PROGRESS NOTES
PT DAILY TREATMENT NOTE/Knee Evaluation    Patient Name: Berny Best  Date:2022  : 1975  [x]  Patient  Verified  Payor: YAMINI / Plan: Samm Baldwin 76 / Product Type:  /    In time:1:35  Out time:2:22  Total Treatment Time (min): 47  Total Timed Codes (min): 16  1:1 Treatment Time ( [address] only): 16   Visit #: 1 of 16    Treatment Area: Left knee pain [M25.562]    SUBJECTIVE  Any medication changes, allergies to medications, adverse drug reactions, diagnosis change, or new procedure performed?: [x] No    [] Yes (see summary sheet for update)  Hx Present Illness: Subjective: Pt reports that she has had a history of knee pain starting back while in the [employer] around . Reports that she had an increase in pain back in January. Reports that she had an MRI which showed lateral meniscal tear. Reports that she had therapy, but continued to have pain. Required to have surgery date 22, without complications. Reports that she was fine after surgery and was able to walk after surgery. Reports that she had follow up with MD who stated everything looked good, just swelling and referred to PT. Denies bucking.     Associated symptoms:  Tingling anterior knee    Pain:  _5__/10 max (in the last 48 hrs) __1_/10 min (in the last 48 hrs) _3__/10 currently at rest    Location: medial and posterior left knee     [] Sharp    [] Dull      [] Burning     [x]  Aching     [x] Throbbing      [] Tingling     [] Other:       [x]  Constant                   [] Intermittent      Increases Pain: standing, walking, bending the knee  Decreases Pain: rest, icing, elevating the LE  Since Onset:  Better     Functional Limitations: Sitting [place] [facility] style, walking long distance, chores, crossing leg to don/doff shoe    Previous treatment:   PT    Co-morbidities: PMHx/Surgical Hx: []DM [] HTN (controlled) [] High cholesterol (controlled) [] Cancer [x]Other fibromyalgia, back pain, neck pain, hip pain  Prior Level of Function:  functionally independent, no AD, pain with activity  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 \"    Barriers: [x] none []pain []financial []time []transportation []other  Motivation: good  Substance use:[x] none  []Alcohol []Tobacco []other:   Cognition: A & O x 3    Objective:    Pt enters gym in no apparent distress, 2 incision on anterior knee    Posture: [] Poor    [] Fair    [] Good    Describe:    Outcome measure: FOTO  Score=54    Movement/gait:  [] Normal     [x] Abnormal: dec left heel strike, dec ankle DF at heel strike, unequal step length, dec arm swing and trunk rotation    Patellar Positioning (Static)   []Left []Right Normal []Left []Right Lateral   [x]Left [x]Right [place]      []Left []Right Medial   []Left []Right [place]      Edema:   (centimeters) Right Left   2\" above mid patella 41.2 42.5   Mid patella 37.7 41.0   2\" below mid patella 33.0 34.5       Palpation: TTP:  Left: Medial tibial and femoral condyle, medial and lateral joint line, lateral femoral condyle, patellar tendon    Patellar Mobility: []Left  []Right Hypermobile [x]Left []Right Hypomobile                           Hip Alignment: n/a        ROM:  [] Unable to assess at this time                 Knee AROM Left Right   Extension Lacking 10 0   Flexion 135 145                            Hip PROM Left Right   Flexion 80 80   ER 25 32   IR 32 30                                          Strength:   [] Unable to assess at this time    Left (0-5) Right (0-5) N/T   Knee extension 3- 5 []   Knee flexion 4- 5 []   Ankle Dorsiflexion (L4) 5 5 []   Ankle Plantarflexion (S1)  #heel raises   [x]   Hip flexion 3-  Extensor lag 4 []   Hip abduction 4- 4- []   Hip ER 3 4- []   Hip IR 3 4- []   Hip extension 3- 3+ []   Glute Max 3- 3- []              Balance: SLS: Left: 2nd attempt:  Inc pronation, increased tibial rotation, unsteady initially 30 seconds Right: 30\"    Squat: shifted to the right, anterior tibial translation, inc lumbar lordosis,     Single leg squat: NT    Neuro Screen [x] WNL Intact to light touch in bilateral LE.   Myotome/Dermatome/Reflexes:  Comments:    Special Test: n/a        Modalities Rationale:     decrease edema, decrease inflammation and decrease pain to improve patient's ability to perform daily activities with decreased pain and symptom levels     min [] Estim, type/location:                                      []  att     []  unatt     []  w/US     []  w/ice    []  w/heat    min []  Mechanical Traction: type/lbs                   []  pro   []  sup   []  int   []  cont    []  before manual    []  after manual    min []  Ultrasound, settings/location:      min []  Iontophoresis w/ dexamethasone, location:                                               []  take home patch       []  in clinic    min []  Ice     []  Heat    location/position:    10 min [x]  Vasopneumatic Device, press/temp: Left knee, elevated, medium compression    min []  Other: Pre vaso: 41.0 cm; post vaso: 40.0 cm, mid patella   [] Skin assessment post-treatment (if applicable):    []  intact    []  redness- no adverse reaction     []redness - adverse reaction:        21 min [x]Eval                  []Re-Eval       8 min Therapeutic Exercise:  [x] See flow sheet :   Rationale: increase ROM and increase strength to improve the patients ability to perform daily activities with decreased pain and symptom levels     8 min Therapeutic Activity:  [x]  See flow sheet :   Rationale: assess ROM and assess strength  to improve the patients ability to perform daily activities with decreased pain and symptom levels            With   [x] TE   [x] TA   [] neuro   [] other: Patient Education: [x] Review HEP    [] Progressed/Changed HEP based on:   [x] positioning   [] body mechanics   [] transfers   [] heat/ice application    [x] other: scar massage, sleeping with knee straight, proper gait       Pain Level (0-10 scale) post treatment: 3/10    Assessment/ pompa information:  Mariel Cosby is a 41 y.o.  yo female who [facility] to [facility] diagnosed with tear of lateral meniscus of left knee. Patient is s/p left knee arthroscopic partial lateral menisectomy on 5/2/22. Patient reports having constant aching throbbing pain at the medial and posterior left knee that increases with standing, walking, bending the knee and limites her ability to sit in [place] [facility] style, walk long distance, chores, and to cross leg to don/doff shoe. Pt has relief with rest, ice, and elevation. Pt rates pain _5__/10 max (in the last 48 hrs) __1_/10 min (in the last 48 hrs) _3__/10 currently at rest  . Patient demonstrates decreased ROM, decreased strength, impaired posture, increased swelling, impaired balance, and decreased functional mobility tolerance. Pt is tender to palpate and has muscle hypertonicity along the left Medial tibial and femoral condyle, medial and lateral joint line, lateral femoral condyle, patellar tendon, and posterior knee. Pt also presents with impaired squatting techniques as well. Pt with positive special test including positive bilateral adductor drop test, which can indicate lumbo-pelvic femoral dysfunction, which can be a contributing factor. Pt would benefit from skilled physical therapy to address these limitations.     Patient will benefit from skilled PT services to modify and progress therapeutic interventions, address functional mobility deficits, address ROM deficits, address strength deficits, analyze and address soft tissue restrictions, analyze and cue movement patterns, analyze and modify body mechanics/ergonomics, assess and modify postural abnormalities, and address imbalance/dizziness to attain remaining goals. [x]  See Plan of Care  []  See progress note/recertification  []  See Discharge Summary    Evaluation Complexity History HIGH Complexity :3+ comorbidities / personal factors will impact the outcome/ POC ; Examination HIGH Complexity : 4+ Standardized tests and measures addressing body structure, function, activity limitation and / or participation in recreation  ;Presentation MEDIUM Complexity : Evolving with changing characteristics  ; Clinical Decision Making MEDIUM Complexity : FOTO score of 26-74 FOTO score = an established functional score where 100 = no disability  Overall Complexity Rating: MEDIUM  Problem List: pain affecting function, decrease ROM, decrease strength, edema affecting function, impaired gait/ balance, decrease ADL/ functional abilitiies, decrease activity tolerance, decrease flexibility/ joint mobility and decrease transfer abilities     Treatment Plan may include any combination of the following: Therapeutic exercise, Therapeutic activities, Neuromuscular re-education, Physical agent/modality, Gait/balance training, Manual therapy, Patient education, Self Care training, Functional mobility training, Home safety training and Stair training  Vasopnuematic compression justification:  Per bilateral girth measures taken and listed above the edema is considered significant and having an impact on the patient's strength, balance, gait, transfers, self care and ADLs  Patient / Family readiness to learn indicated by: asking questions, trying to perform skills and interest  Persons(s) to be included in education: patient (P)  Barriers to Learning/Limitations: None  Patient Goal (s):  \"to be able to get back to walking on the treadmill and going out for long periods of time. \"    Patient Self Reported Health Status: excellent  Rehabilitation Potential: good    Short Term Goals:    To be accomplished in 2 weeks: 1. Patient will report compliance with HEP 1x/day to aid in rehabilitation program.  Status at IE: Provided pt with HEP and pt appeared to understand. Current:Same as IE     2. Patient will rate pain on greater than 4/10 so pt can perform ADL's. Status at IE:Pt rates pain _5__/10 max (in the last 48 hrs) __1_/10 min (in the last 48 hrs) _3__/10 currently at rest   Current: Same as IE    Long Term Goals: To be accomplished in 8 weeks: 1. Patient will increase limited Bilateral LE strength by at least . 5 grade MMT throughout so pt can perform stairs. Status at IE:   Left (0-5) Right (0-5) N/T   Knee extension 3- 5 []   Knee flexion 4- 5 []   Ankle Dorsiflexion (L4) 5 5 []   Ankle Plantarflexion (S1)  #heel raises   [x]   Hip flexion 3-  Extensor lag 4 []   Hip abduction 4- 4- []   Hip ER 3 4- []   Hip IR 3 4- []   Hip extension 3- 3+ []   Glute Max 3- 3- []     Current: Same as IE    2. Patient will report pain no greater than 1-2/10 throughout entire day to aid in completion of ADLs. Status at IE:Pt rates pain _5__/10 max (in the last 48 hrs) __1_/10 min (in the last 48 hrs) _3__/10 currently at rest   Current: Same as IE    3. Patient will 0-140 degrees of right knee ROM so pt can  objects off the ground. Status at IE:   Knee AROM Left Right   Extension Lacking 10 0   Flexion 135 145   Current: Same as IE    4. Patient will improve FOTO score to 71 points to demonstrate improvement in functional status. Status at IE:54  Current: Same as IE    *FOTO score is an established functional score where 100 = no disability*    5. Pt will have 5-10 deg inc in hip ROM so pt can don/doff shoes  Status at IE:   Hip PROM Left Right   Flexion 80 80   ER 25 32   IR 32 30   Current: same as IE    Frequency / Duration: Patient to be seen 2 times per week for 8  weeks.     PLAN  [x]  Upgrade activities as tolerated     []  Continue plan of care  [x]  Update interventions per flow sheet       []  Discharge due to:_  [] Other:_        Stanley Nissen, PT, DPT, [facility] 5/31/2022  8:28 AM

## 2022-06-02 ENCOUNTER — HOSPITAL ENCOUNTER (OUTPATIENT)
Dept: PHYSICAL THERAPY | Age: 47
Discharge: HOME | End: 2022-06-02
Payer: OTHER GOVERNMENT

## 2022-06-02 PROCEDURE — 97530 THERAPEUTIC ACTIVITIES: CPT

## 2022-06-02 PROCEDURE — 97016 VASOPNEUMATIC DEVICE THERAPY: CPT

## 2022-06-02 PROCEDURE — 97112 NEUROMUSCULAR REEDUCATION: CPT

## 2022-06-02 PROCEDURE — 97110 THERAPEUTIC EXERCISES: CPT

## 2022-06-02 NOTE — PROGRESS NOTES
PT DAILY TREATMENT NOTE    Patient Name: Peyman Wolf  Date:2022  : 1975  [x]  Patient  Verified  Payor: YAMINI / Plan: Samm Baldwin 74 / Product Type:  /    In time:11:33  Out time:12:30  Total Treatment Time (min): 57  Total Timed Codes (min): 57  1:1 Treatment Time (MC/BCBS only): 57   Visit #: 2 of 16    Treatment Dx: Left knee pain [M25.562]    SUBJECTIVE  Pain Level (0-10 scale): 2  Any medication changes, allergies to medications, adverse drug reactions, diagnosis change, or new procedure performed?: [x] No    [] Yes (see summary sheet for update)  Subjective functional status/changes:   [] No changes reported  \"Still swollen. \"    OBJECTIVE    Modalities Rationale:     decrease edema, decrease inflammation and decrease pain to improve patient's ability to perform daily activities with decreased pain and symptom levels   min [] Estim, type/location:                                      []  att     []  unatt     []  w/US     []  w/ice    []  w/heat    min []  Mechanical Traction: type/lbs                   []  pro   []  sup   []  int   []  cont    []  before manual    []  after manual    min []  Ultrasound, settings/location:      min []  Iontophoresis w/ dexamethasone, location:                                               []  take home patch       []  in clinic    min []  Ice     []  Heat    location/position:    10 min [x]  Vasopneumatic Device, press/temp: Supine, left knee   If using vaso (only need to measure limb vaso being performed on)      pre-treatment girth : 39cm      post-treatment girth : 38.5cm      measured at (landmark location) :  Joint line     min []  Other:    [x] Skin assessment post-treatment (if applicable):    [x]  intact    []  redness- no adverse reaction                  []redness - adverse reaction:        23 min Therapeutic Exercise:  [x] See flow sheet :   Rationale: increase ROM and increase strength to improve the patients ability to perform daily activities with decreased pain and symptom levels    9 min Therapeutic Activity:  -  See flow sheet :   Rationale: increase strength, improve coordination and increase proprioception  to improve the patients ability to perform daily activities with decreased pain and symptom levels     15 min Neuromuscular Re-education:  [x]  See flow sheet :   Rationale: increase strength, improve coordination, improve balance and increase proprioception  to improve the patients ability to perform daily activities with decreased pain and symptom levels          With   [] TE   [] TA   [] neuro   [] other: Patient Education: [x] Review HEP    [] Progressed/Changed HEP based on:   [] positioning   [] body mechanics   [] transfers   [] heat/ice application    [] other:      Other Objective/Functional Measures:   Left knee extension: 2* hyperextension post session      Pain Level (0-10 scale) post treatment: 0    ASSESSMENT/Changes in Function: Pt tolerated session well with reporting no pain post session and improved left knee extension ROM with ability to get to terminal knee extension. Pt responding well to pelvic repositioning exercises with glut fatigue reported bilaterally and no knee pain. Will continue to progress as tolerated. Patient will continue to benefit from skilled PT services to modify and progress therapeutic interventions, address functional mobility deficits, address ROM deficits, address strength deficits, analyze and cue movement patterns, analyze and modify body mechanics/ergonomics, assess and modify postural abnormalities, address imbalance/dizziness and instruct in home and community integration to attain remaining goals. [x]  See Plan of Care  []  See progress note/recertification  []  See Discharge Summary         Progress towards goals / Updated goals:  Short Term Goals: To be accomplished in 2 weeks: 1. Patient will report compliance with HEP 1x/day to aid in rehabilitation program.  Status at IE: Provided pt with HEP and pt appeared to understand. Current: compliance per pt report progressing 6/2/22     2. Patient will rate pain on greater than 4/10 so pt can perform ADL's. Status at IE:Pt rates pain _5__/10 max (in the last 48 hrs) __1_/10 min (in the last 48 hrs) _3__/10 currently at rest   Current: Same as IE     Long Term Goals: To be accomplished in 8 weeks: 1. Patient will increase limited Bilateral LE strength by at least . 5 grade MMT throughout so pt can perform stairs. Status at IE:    Left (0-5) Right (0-5) N/T   Knee extension 3- 5 []?    Knee flexion 4- 5 []?    Ankle Dorsiflexion (L4) 5 5 []?    Ankle Plantarflexion (S1)  #heel raises     [x]?    Hip flexion 3-  Extensor lag 4 []?    Hip abduction 4- 4- []?    Hip ER 3 4- []?    Hip IR 3 4- []?    Hip extension 3- 3+ []?    Glute Max 3- 3- []?       Current: Same as IE     2.Patient will report pain no greater than 1-2/10 throughout entire day to aid in completion of ADLs. Status at IE:Pt rates pain _5__/10 max (in the last 48 hrs) __1_/10 min (in the last 48 hrs) _3__/10 currently at rest   Current: Same as IE     3. Patient will 0-140 degrees of right knee ROM so pt can  objects off the ground. Status at IE:   Knee AROM Left Right   Extension Lacking 10 0   Flexion 135 145   Current: left knee 2* hyper extension progressing 6/2/22     4. Patient will improve FOTO score to 71 points to demonstrate improvement in functional status.   Status at IE:54  Current: Same as IE     *FOTO score is an established functional score where 100 = no disability*     5. Pt will have 5-10 deg inc in hip ROM so pt can don/doff shoes  Status at IE:   Hip PROM Left Right   Flexion 80 80   ER 25 32   IR 32 30   Current: same as IE      PLAN  []  Upgrade activities as tolerated     [x]  Continue plan of care  []  Update interventions per flow sheet       []  Discharge due to:_  []  Other:_      Jose Guadalupe Queen 6/2/2022  11:21 AM    Future Appointments   Date Time Provider Amanda Al   6/2/2022 11:30 AM Naomia Mikaela [facility]   6/3/2022  3:45 PM [place] Fret, PT [facility]   6/7/2022 12:45 PM Tomas Decree, PT [facility]   6/8/2022  1:30 PM Elinor Parisian, PT [facility]   6/13/2022 12:15 PM RemJohnathan yuan [facility]   6/14/2022 11:15 AM Cloyce Sicard, PA [facility]   6/15/2022 10:45 AM Diamond[place] Door, PTA [facility]   7/1/2022 10:45 AM Tomas Decree, PT [facility]   7/5/2022 10:45 AM Tomas Decree, PT [facility]   7/7/2022 10:45 AM Tomas Decree, PT [facility]   7/12/2022 10:45 AM Tomas Decree, PT [facility]   7/14/2022 10:45 AM Tomas Decree, PT [facility]   7/19/2022 10:45 AM Tomas Decree, PT [facility]   7/21/2022 10:45 AM Tomas Decree, PT [facility]

## 2022-06-03 ENCOUNTER — HOSPITAL ENCOUNTER (OUTPATIENT)
Dept: PHYSICAL THERAPY | Age: 47
Discharge: HOME | End: 2022-06-03
Payer: OTHER GOVERNMENT

## 2022-06-03 PROCEDURE — 97110 THERAPEUTIC EXERCISES: CPT

## 2022-06-03 PROCEDURE — 97112 NEUROMUSCULAR REEDUCATION: CPT

## 2022-06-03 NOTE — PROGRESS NOTES
PT DAILY TREATMENT NOTE    Patient Name: Luca Credit  Date:6/3/2022  : 1975  [x]  Patient  Verified  Payor: YAMINI / Plan: Samm Baldwin 74 / Product Type:  /    In time:3:49  Out time:4:28pm  Total Treatment Time (min): 39  Total Timed Codes (min): 39  1:1 Treatment Time (MC/BCBS only): N/A   Visit #: 3 of 16    Treatment Dx: Left knee pain [M25.562]    SUBJECTIVE  Pain Level (0-10 scale): 2  Any medication changes, allergies to medications, adverse drug reactions, diagnosis change, or new procedure performed?: [x] No    [] Yes (see summary sheet for update)  Subjective functional status/changes:   [] No changes reported  The pt denied use of any pain medication for knee pain. She reports ice provided swelling and pain relief     OBJECTIVE      25 min Therapeutic Exercise:  [x] See flow sheet :   Rationale: increase ROM, increase strength and improve coordination to improve the patients ability to perform daily activities with decreased pain and symptom levels    14 min Neuromuscular Re-education:  [x]  See flow sheet :   Rationale: increase ROM, increase strength, improve coordination, improve balance and increase proprioception  to improve the patients ability to perform daily activities with decreased pain and symptom levels          With   [] TE   [] TA   [] neuro   [] other: Patient Education: [x] Review HEP    [] Progressed/Changed HEP based on:   [] positioning   [] body mechanics   [] transfers   [] heat/ice application    [] other:      Other Objective/Functional Measures: updated pain goal      Pain Level (0-10 scale) post treatment: 2    ASSESSMENT/Changes in Function: The pt reported to therapy with a pleasant attitude and ready to participate in therapeutic exercises. Patient tolerated treatment session well today. Patient had no complaints with addition of right S/L glut med to exercise program to accomplish an increase in pelvis stability.   Patient continues to make good progress toward goals and would benefit from continued skilled PT intervention to address remaining deficits outlined in goals below. Patient will continue to benefit from skilled PT services to modify and progress therapeutic interventions, address functional mobility deficits, address ROM deficits, address strength deficits, analyze and address soft tissue restrictions, analyze and cue movement patterns, analyze and modify body mechanics/ergonomics, assess and modify postural abnormalities and address imbalance/dizziness to attain remaining goals. [x]  See Plan of Care  []  See progress note/recertification  []  See Discharge Summary         Progress towards goals / Updated goals:  Short Term Goals:   To be accomplished in 2 weeks: 1. Patient will report compliance with HEP 1x/day to aid in rehabilitation program.  Status at IE: Provided pt with HEP and pt appeared to understand. Status on 6/2/22: compliance per pt report progressing     2. Patient will rate pain on greater than 4/10 so pt can perform ADL's. Status at IE:Pt rates pain _5__/10 max (in the last 48 hrs) __1_/10 min (in the last 48 hrs) _3__/10 currently at rest   Current 6/3/22: pt arrived to therapy with a 2/10 pain that was not increased by any exercises      Long Term Goals: To be accomplished in 8 weeks: 1. Patient will increase limited Bilateral LE strength by at least . 5 grade MMT throughout so pt can perform stairs. Status at IE:    Left (0-5) Right (0-5) N/T   Knee extension 3- 5 []? ?    Knee flexion 4- 5 []? ?    Ankle Dorsiflexion (L4) 5 5 []? ?    Ankle Plantarflexion (S1)  #heel raises     [x]? ?    Hip flexion 3-  Extensor lag 4 []? ?    Hip abduction 4- 4- []??    Hip ER 3 4- []??    Hip IR 3 4- []??    Hip extension 3- 3+ []? ?    Glute Max 3- 3- []??       Current: Same as IE     2.Patient will report pain no greater than 1-2/10 throughout entire day to aid in completion of ADLs.   Status at IE:Pt rates pain _5__/10 max (in the last 48 hrs) __1_/10 min (in the last 48 hrs) _3__/10 currently at rest   Current: Same as IE     3. Patient will 0-140 degrees of right knee ROM so pt can  objects off the ground. Status at IE:   Knee AROM Left Right   Extension Lacking 10 0   Flexion 135 145   Current: left knee 2* hyper extension progressing 6/2/22     4. Patient will improve FOTO score to 71 points to demonstrate improvement in functional status.   Status at IE:54  Current: Same as IE     *FOTO score is an established functional score where 100 = no disability*     5. Pt will have 5-10 deg inc in hip ROM so pt can don/doff shoes  Status at IE:   Hip PROM Left Right   Flexion 80 80   ER 25 32   IR 32 30   Current: same as IE           PLAN  []  Upgrade activities as tolerated     [x]  Continue plan of care  []  Update interventions per flow sheet       []  Discharge due to:_  []  Other:_      Deena Dukes PT 6/3/2022  4:39 PM    Future Appointments   Date Time Provider Amanda Al   6/7/2022 12:45 PM Ephriam Segal, PT [facility]   6/8/2022  1:30 PM Ronna Damico [facility]   6/13/2022 12:15 PM Candido Rankin [facility]   6/14/2022 11:15 AM JAVIER Rodas [facility]   6/15/2022 10:45 AM Delta Brittle, PTA [facility]   7/1/2022 10:45 AM Ephriam Segal, PT [facility]   7/5/2022 10:45 AM Ephriam Segal, PT [facility]   7/7/2022 10:45 AM Ephriam Segal, PT [facility]   7/12/2022 10:45 AM Ephriam Segal, PT [facility]   7/14/2022 10:45 AM Ephriam Segal, PT [facility]   7/19/2022 10:45 AM JASON Sher [facility]   7/21/2022 10:45 AM JASON Sher [facility]

## 2022-06-07 ENCOUNTER — HOSPITAL ENCOUNTER (OUTPATIENT)
Dept: PHYSICAL THERAPY | Age: 47
Discharge: HOME | End: 2022-06-07
Payer: OTHER GOVERNMENT

## 2022-06-07 PROCEDURE — 97016 VASOPNEUMATIC DEVICE THERAPY: CPT

## 2022-06-07 PROCEDURE — 97530 THERAPEUTIC ACTIVITIES: CPT

## 2022-06-07 PROCEDURE — 97112 NEUROMUSCULAR REEDUCATION: CPT

## 2022-06-07 PROCEDURE — 97110 THERAPEUTIC EXERCISES: CPT

## 2022-06-07 NOTE — PROGRESS NOTES
PT DAILY TREATMENT NOTE    Patient Name: Luca Credit  Date:2022  : 1975  [x]  Patient  Verified  Payor:  / Plan: Samm Baldwin 74 / Product Type:  /    In time:12:46  Out time:1:42  Total Treatment Time (min): 56  Total Timed Codes (min): 46  1:1 Treatment Time (MC/BCBS only): 55   Visit #: 4 of 16    Treatment Dx: Left knee pain [M25.562]    SUBJECTIVE  Pain Level (0-10 scale): 3/10  Any medication changes, allergies to medications, adverse drug reactions, diagnosis change, or new procedure performed?: [x] No    [] Yes (see summary sheet for update)  Subjective functional status/changes:   [] No changes reported  Pt reports that she started having inc soreness over the weekend, maybe because she is up doing more.      OBJECTIVE    Modalities Rationale:     decrease edema, decrease inflammation and decrease pain  to improve patient's ability to perform daily activities with decreased pain and symptom levels     min [] Estim, type/location:                                      []  att     []  unatt     []  w/US     []  w/ice    []  w/heat    min []  Mechanical Traction: type/lbs                   []  pro   []  sup   []  int   []  cont    []  before manual    []  after manual    min []  Ultrasound, settings/location:      min []  Iontophoresis w/ dexamethasone, location:                                               []  take home patch       []  in clinic    min []  Ice     []  Heat    location/position:    10 min [x]  Vasopneumatic Device, press/temp: Left knee   If using vaso (only need to measure limb vaso being performed on)      pre-treatment girth : 39.8 cm      post-treatment girth : 39.5 cm      measured at (landmark location) :  Mid patella    min []  Other:    [] Skin assessment post-treatment (if applicable):    []  intact    []  redness- no adverse reaction                  []redness - adverse reaction:            13 min Therapeutic Exercise:  [] See flow sheet : Rationale: increase ROM, increase strength, improve coordination, improve balance and increase proprioception to improve the patients ability to perform daily activities with decreased pain and symptom levels      10 min Therapeutic Activity:  []  See flow sheet :   Rationale: increase ROM, increase strength, improve coordination, improve balance and increase proprioception  to improve the patients ability to perform daily activities with decreased pain and symptom levels     23 min Neuromuscular Re-education:  []  See flow sheet :   Rationale: increase ROM, increase strength, improve coordination, improve balance and increase proprioception  to improve the patients ability to perform daily activities with decreased pain and symptom levels            With   [x] TE   [x] TA   [x] neuro   [] other: Patient Education: [x] Review HEP    [] Progressed/Changed HEP based on:   [x] positioning   [x] body mechanics   [] transfers   [] heat/ice application    [x] other: scar massage, PPT with gait     Other Objective/Functional Measures: Pt enters gym in no apparent distress. Pretest/Post  Adductor Drop Test: right:  Pos    Left: Pos    Lower trunk rotation (inches): Right: 15.5 left: 16  Knee flexion: 141 degrees; knee extension: lacking 2       Pain Level (0-10 scale) post treatment: 2/10    ASSESSMENT/Changes in Function: Patient tolerated therapy session well as there were no adverse reactions today. Pt requires tactile cuing for sidelying pelvic repositioning techniques. Pt had some soreness with step reach on the BOSU. Pt is progressing with therapy as indicated by pt tolerating increase in exercise repetitions and resistance. Although showing progress patient would benefit from continuation of skilled physical therapy to address the remaining limitations.        Patient will continue to benefit from skilled PT services to modify and progress therapeutic interventions, address functional mobility deficits, address ROM deficits, address strength deficits, analyze and address soft tissue restrictions, analyze and cue movement patterns, analyze and modify body mechanics/ergonomics, assess and modify postural abnormalities, address imbalance/dizziness and instruct in home and community integration to attain remaining goals. [x]  See Plan of Care  []  See progress note/recertification  []  See Discharge Summary         Progress towards goals / Updated goals:  Short Term Goals:   To be accomplished in 2 weeks: 1. Patient will report compliance with HEP 1x/day to aid in rehabilitation program.  Status at IE: Provided pt with HEP and pt appeared to understand. Status on 6/7/22: compliance per pt report, updated but forgot to give pt the handout-will give next visit     2. Patient will rate pain on greater than 4/10 so pt can perform ADL's. Status at IE:Pt rates pain _5__/10 max (in the last 48 hrs) __1_/10 min (in the last 48 hrs) _3__/10 currently at rest   Current 6/7/22: pt arrived to therapy with a 3/10 pain, ended with 2/10     Long Term Goals: To be accomplished in 8 weeks: 1. Patient will increase limited Bilateral LE strength by at least . 5 grade MMT throughout so pt can perform stairs. Status at IE:    Left (0-5) Right (0-5) N/T   Knee extension 3- 5 []? ??    Knee flexion 4- 5 []? ??    Ankle Dorsiflexion (L4) 5 5 []? ??    Ankle Plantarflexion (S1)  #heel raises     [x]? ??    Hip flexion 3-  Extensor lag 4 []? ??    Hip abduction 4- 4- []???    Hip ER 3 4- []???    Hip IR 3 4- []???    Hip extension 3- 3+ []???    Glute Max 3- 3- []???       Current: 6/7/22 pt with difficulty with performing bridges     2.Patient will report pain no greater than 1-2/10 throughout entire day to aid in completion of ADLs.   Status at IE:Pt rates pain _5__/10 max (in the last 48 hrs) __1_/10 min (in the last 48 hrs) _3__/10 currently at rest   Current: 6/7/22 see STG     3. Patient will 0-140 degrees of right knee ROM so pt can pick up objects off the ground. Status at IE:   Knee AROM Left Right   Extension Lacking 10 0   Flexion 135 145   Current: 6/7/22 Knee flexion: 141 degrees; knee extension: lacking 2-progressing     4. Patient will improve FOTO score to 71 points to demonstrate improvement in functional status.   Status at IE:54  Current: 6/7/22  Same as IE, will perform on 5th visit     *FOTO score is an established functional score where 100 = no disability*     5. Pt will have 5-10 deg inc in hip ROM so pt can don/doff shoes  Status at IE:   Hip PROM Left Right   Flexion 80 80   ER 25 32   IR 32 30   Current: 6/7/22 same as IE        PLAN  []  Upgrade activities as tolerated     [x]  Continue plan of care  []  Update interventions per flow sheet       []  Discharge due to:_  []  Other:_      Karen De La Rosa, PT, DPT, [facility] 6/7/2022  8:25 AM    Future Appointments   Date Time Provider Amanda Al   6/7/2022 12:45 PM Letahortensia Damona, PT [facility]   6/8/2022  1:30 PM Lars London PT [facility]   6/13/2022 12:15 PM Maritza Rankin [facility]   6/14/2022 11:15 AM JAVIER Garcia [facility]   6/15/2022 10:45 AM Paulette France PTA [facility]   7/1/2022 10:45 AM Leta Nayeli, PT [facility]   7/5/2022 10:45 AM Leta Nayeli, PT [facility]   7/7/2022 10:45 AM Leta Nayeli, PT [facility]   7/12/2022 10:45 AM Leta Nayeli, PT [facility]   7/14/2022 10:45 AM Leta Nayeli, PT [facility]   7/19/2022 10:45 AM Leta Nayeli, PT [facility]   7/21/2022 10:45 AM Leta Tyler PT [facility]

## 2022-06-08 ENCOUNTER — HOSPITAL ENCOUNTER (OUTPATIENT)
Dept: PHYSICAL THERAPY | Age: 47
Discharge: HOME | End: 2022-06-08
Payer: OTHER GOVERNMENT

## 2022-06-08 PROCEDURE — 97530 THERAPEUTIC ACTIVITIES: CPT

## 2022-06-08 PROCEDURE — 97110 THERAPEUTIC EXERCISES: CPT

## 2022-06-08 PROCEDURE — 97112 NEUROMUSCULAR REEDUCATION: CPT

## 2022-06-08 PROCEDURE — 97016 VASOPNEUMATIC DEVICE THERAPY: CPT

## 2022-06-08 NOTE — PROGRESS NOTES
PT DAILY TREATMENT NOTE    Patient Name: Sury Aguilar  Date:2022  : 1975  [x]  Patient  Verified  Payor:  / Plan: Samm Baldwin 76 / Product Type:  /    In time:1:29  Out time: 2:26  Total Treatment Time (min): 57  Total Timed Codes (min): 47  1:1 Treatment Time (MC/BCBS only): 57   Visit #: 5 of 16     Treatment Dx: Left knee pain [M25.562]    SUBJECTIVE  Pain Level (0-10 scale): 2  Any medication changes, allergies to medications, adverse drug reactions, diagnosis change, or new procedure performed?: [x] No    [] Yes (see summary sheet for update)  Subjective functional status/changes:   [] No changes reported  Slightly sore from yesterdays session. Reports still going up stairs with right leg only.        OBJECTIVE    Modalities Rationale:     decrease edema, decrease inflammation, decrease pain and increase tissue extensibility to improve patient's ability to perform pain free ADLs   min [] Estim, type/location:                                      []  att     []  unatt     []  w/US     []  w/ice    []  w/heat    min []  Mechanical Traction: type/lbs                   []  pro   []  sup   []  int   []  cont    []  before manual    []  after manual    min []  Ultrasound, settings/location:      min []  Iontophoresis w/ dexamethasone, location:                                               []  take home patch       []  in clinic    min []  Ice     []  Heat    location/position:    10 min [x]  Vasopneumatic Device, press/temp: 36, med   If using vaso (only need to measure limb vaso being performed on)      pre-treatment girth : 37 cm      post-treatment girth : 36.3 cm      measured at (landmark location) :  Mid patella    min []  Other:    [] Skin assessment post-treatment (if applicable):    []  intact    []  redness- no adverse reaction                  []redness - adverse reaction:        12 min Therapeutic Exercise:  [x] See flow sheet :   Rationale: increase ROM and increase strength to improve the patients ability to tolerate functional activities. 12 min Therapeutic Activity:  [x]  See flow sheet :   Rationale: increase ROM, increase strength and improve coordination  to improve the patients ability to return to PLOF,     23 min Neuromuscular Re-education:  [x]  See flow sheet :   Rationale: increase ROM, increase strength, improve coordination and improve balance  to improve the patients ability to stabilize knee during dynamic activities. With   [] TE   [] TA   [] neuro   [] other: Patient Education: [x] Review HEP    [] Progressed/Changed HEP based on:   [] positioning   [] body mechanics   [] transfers   [] heat/ice application    [] other:         Pain Level (0-10 scale) post treatment: 0    ASSESSMENT/Changes in Function: Pt with good tolerance to today's session. Pt reported challenge during performance of bosu step reach due to knee and ankle instability. Pt with good tolerance to addition of 4\" step up in order to improve quad strength. Pt is making good progress towards their goals. Pt will continue to benefit from skilled therapeutic intervention at this time to address th remaining deficits as discussed in goals below. Patient will continue to benefit from skilled PT services to modify and progress therapeutic interventions, address functional mobility deficits, address ROM deficits, address strength deficits, analyze and address soft tissue restrictions, analyze and cue movement patterns, analyze and modify body mechanics/ergonomics, assess and modify postural abnormalities and address imbalance/dizziness to attain remaining goals. [x]  See Plan of Care  []  See progress note/recertification  []  See Discharge Summary         Progress towards goals / Updated goals:  Short Term Goals:   To be accomplished in 2 weeks: 1. Patient will report compliance with HEP 1x/day to aid in rehabilitation program.  Status at IE: Provided pt with HEP and pt appeared to understand. Status on 6/8/22: Pt given hand out and reports daily compliance     2. Patient will rate pain on greater than 4/10 so pt can perform ADL's. Status at IE:Pt rates pain _5__/10 max (in the last 48 hrs) __1_/10 min (in the last 48 hrs) _3__/10 currently at rest   Current 6/7/22: pt arrived to therapy with a 3/10 pain, ended with 2/10     Long Term Goals: To be accomplished in 8 weeks: 1. Patient will increase limited Bilateral LE strength by at least . 5 grade MMT throughout so pt can perform stairs. Status at IE:    Left (0-5) Right (0-5) N/T   Knee extension 3- 5 []????    Knee flexion 4- 5 []????    Ankle Dorsiflexion (L4) 5 5 []????    Ankle Plantarflexion (S1)  #heel raises     [x]? ???    Hip flexion 3-  Extensor lag 4 []????    Hip abduction 4- 4- []????    Hip ER 3 4- []????    Hip IR 3 4- []????    Hip extension 3- 3+ []????    Glute Max 3- 3- []????       Current: 6/7/22 pt with difficulty with performing bridges     2.Patient will report pain no greater than 1-2/10 throughout entire day to aid in completion of ADLs. Status at IE:Pt rates pain _5__/10 max (in the last 48 hrs) __1_/10 min (in the last 48 hrs) _3__/10 currently at rest   Current: 6/7/22 see STG     3. Patient will 0-140 degrees of right knee ROM so pt can  objects off the ground. Status at IE:   Knee AROM Left Right   Extension Lacking 10 0   Flexion 135 145   Current: 6/7/22 Knee flexion: 141 degrees; knee extension: lacking 2-progressing     4. Patient will improve FOTO score to 71 points to demonstrate improvement in functional status.   Status at IE:54  Current: 6/8/22 54  *FOTO score is an established functional score where 100 = no disability*     5. Pt will have 5-10 deg inc in hip ROM so pt can don/doff shoes  Status at IE:   Hip PROM Left Right   Flexion 80 80   ER 25 32   IR 32 30   Current: 6/7/22 same as IE      PLAN  []  Upgrade activities as tolerated     [x]  Continue plan of care  []  Update interventions per flow sheet       []  Discharge due to:_  []  Other:_      Barak Grigsby, PT 6/8/2022  8:27 AM    Future Appointments   Date Time Provider Amanda Al   6/8/2022  1:30 PM Juan Diego Giles, PT [facility]   6/13/2022 12:15 PM Dalton Rankin [facility]   6/14/2022 11:15 AM JAVIER Goldstein [facility]   6/15/2022 10:45 AM Rama Maki, PTA [facility]   7/1/2022 10:45 AM Heide Fátima, PT [facility]   7/5/2022 10:45 AM Heide Fátima, PT [facility]   7/7/2022 10:45 AM Heide Fátima, PT [facility]   7/12/2022 10:45 AM Heide Fátima, PT [facility]   7/14/2022 10:45 AM Heide Fátima, PT [facility]   7/19/2022 10:45 AM Heide Fátima, PT [facility]   7/21/2022 10:45 AM Heide Fátima, PT [facility]

## 2022-06-13 ENCOUNTER — HOSPITAL ENCOUNTER (OUTPATIENT)
Dept: PHYSICAL THERAPY | Age: 47
Discharge: HOME | End: 2022-06-13
Payer: OTHER GOVERNMENT

## 2022-06-13 PROCEDURE — 97112 NEUROMUSCULAR REEDUCATION: CPT

## 2022-06-13 PROCEDURE — 97016 VASOPNEUMATIC DEVICE THERAPY: CPT

## 2022-06-13 PROCEDURE — 97110 THERAPEUTIC EXERCISES: CPT

## 2022-06-13 PROCEDURE — 97530 THERAPEUTIC ACTIVITIES: CPT

## 2022-06-13 NOTE — PROGRESS NOTES
PT DAILY TREATMENT NOTE    Patient Name: Isabella Banegas  Date:2022  : 1975  [x]  Patient  Verified  Payor:  / Plan: [facility] / Product Type: Vanessa Pham /    In time:12:17  Out time:1:15  Total Treatment Time (min): 58   Total Timed Codes (min): 58  1:1 Treatment Time (MC/BCBS only): 62   Visit #: 6 of 16    Treatment Dx: Left knee pain [M25.562]    SUBJECTIVE  Pain Level (0-10 scale): 1  Any medication changes, allergies to medications, adverse drug reactions, diagnosis change, or new procedure performed?: [x] No    [] Yes (see summary sheet for update)  Subjective functional status/changes:   [] No changes reported  \"good\"    OBJECTIVE    Modalities Rationale:     decrease edema, decrease inflammation and decrease pain to improve patient's ability to perform daily activities with decreased pain and symptom levels   min [] Estim, type/location:                                      []  att     []  unatt     []  w/US     []  w/ice    []  w/heat    min []  Mechanical Traction: type/lbs                   []  pro   []  sup   []  int   []  cont    []  before manual    []  after manual    min []  Ultrasound, settings/location:      min []  Iontophoresis w/ dexamethasone, location:                                               []  take home patch       []  in clinic    min []  Ice     []  Heat    location/position:    10 min [x]  Vasopneumatic Device, press/temp: Left knee   If using vaso (only need to measure limb vaso being performed on)      pre-treatment girth : 37.5cm      post-treatment girth : 37.4 cm      measured at (landmark location) :  Joint line    min []  Other:    [] Skin assessment post-treatment (if applicable):    []  intact    []  redness- no adverse reaction                  []redness - adverse reaction:          20 min Therapeutic Exercise:  [x] See flow sheet :   Rationale: increase ROM, increase strength and increase proprioception to improve the patients ability to perform daily activities with decreased pain and symptom levels    18 min Therapeutic Activity:  [x]  See flow sheet :   Rationale: increase ROM and increase strength  to improve the patients ability to perform daily activities with decreased pain and symptom levels     20 min Neuromuscular Re-education:  [x]  See flow sheet :   Rationale: increase ROM, increase strength, improve coordination and increase proprioception  to improve the patients ability to perform daily activities with decreased pain and symptom levels            With   [] TE   [] TA   [] neuro   [] other: Patient Education: [x] Review HEP    [] Progressed/Changed HEP based on:   [] positioning   [] body mechanics   [] transfers   [] heat/ice application    [] other:      Other Objective/Functional Measures:   Left knee AROM 2 - 137*       Pain Level (0-10 scale) post treatment: 0     ASSESSMENT/Changes in Function: Pt tolerates session well with no increase in knee pain post session. Pt instructed on proper weight shifting when negotiating stairs with ability to complete with knee pain. Pt still challenged with putting full weight on left LE with bosu step and reach possibly due to pain or fear. Tactile cues still needed to keep posterior pelvic tilts with exercises. Tolerated glut and hamstring strengthening exercises well. Left knee AROM is improving however still lacking TKE. Patient will continue to benefit from skilled PT services to modify and progress therapeutic interventions, address functional mobility deficits, address ROM deficits, address strength deficits, analyze and cue movement patterns, analyze and modify body mechanics/ergonomics, assess and modify postural abnormalities and instruct in home and community integration to attain remaining goals.      [x]  See Plan of Care  []  See progress note/recertification  []  See Discharge Summary         Progress towards goals / Updated goals:  Short Term Goals:   To be accomplished in 2 weeks: 1. Patient will report compliance with HEP 1x/day to aid in rehabilitation program.  Status at IE: Provided pt with HEP and pt appeared to understand. Status on 6/8/22: Pt given hand out and reports daily compliance     2. Patient will rate pain on greater than 4/10 so pt can perform ADL's. Status at IE:Pt rates pain _5__/10 max (in the last 48 hrs) __1_/10 min (in the last 48 hrs) _3__/10 currently at rest   Current 6/7/22: pt arrived to therapy with a 3/10 pain, ended with 2/10     Long Term Goals: To be accomplished in 8 weeks: 1. Patient will increase limited Bilateral LE strength by at least . 5 grade MMT throughout so pt can perform stairs. Status at IE:    Left (0-5) Right (0-5) N/T   Knee extension 3- 5 []?????    Knee flexion 4- 5 []?????    Ankle Dorsiflexion (L4) 5 5 []?????    Ankle Plantarflexion (S1)  #heel raises     [x]?????    Hip flexion 3-  Extensor lag 4 []?????    Hip abduction 4- 4- []?????    Hip ER 3 4- []?????    Hip IR 3 4- []?????    Hip extension 3- 3+ []?????    Glute Max 3- 3- []?????       Current: 6/7/22 pt with difficulty with performing bridges     2.Patient will report pain no greater than 1-2/10 throughout entire day to aid in completion of ADLs. Status at IE:Pt rates pain _5__/10 max (in the last 48 hrs) __1_/10 min (in the last 48 hrs) _3__/10 currently at rest   Current: 6/7/22 see STG     3. Patient will 0-140 degrees of right knee ROM so pt can  objects off the ground. Status at IE:   Knee AROM Left Right   Extension Lacking 10 0   Flexion 135 145   Current: Knee ROM 2-137 deg progressing 6/13/22     4. Patient will improve FOTO score to 71 points to demonstrate improvement in functional status.   Status at IE:54  Current: 6/8/22 54  *FOTO score is an established functional score where 100 = no disability*     5. Pt will have 5-10 deg inc in hip ROM so pt can don/doff shoes  Status at IE:   Hip PROM Left Right   Flexion 80 80   ER 25 32   IR 32 30 Current: 6/7/22 same as IE     PLAN  []  Upgrade activities as tolerated     [x]  Continue plan of care  []  Update interventions per flow sheet       []  Discharge due to:_  []  Other:_      Kate Mello Remesi 6/13/2022  12:41 PM    Future Appointments   Date Time Provider Amanda Al   6/14/2022 11:15 AM JAVIER Barahona [facility]   6/15/2022 10:45 AM Jayne Butcher, PTA [facility]   7/1/2022 10:45 AM Pamalee Fee, PT [facility]   7/5/2022 10:45 AM Pamalee Fee, PT [facility]   7/7/2022 10:45 AM Pamalee Fee, PT [facility]   7/12/2022 10:45 AM Pamalee Fee, PT [facility]   7/14/2022 10:45 AM Pamalee Fee, PT [facility]   7/19/2022 10:45 AM Pamalee Fee, PT [facility]   7/21/2022 10:45 AM Pamalee Fee, PT [facility]

## 2022-06-14 ENCOUNTER — OFFICE VISIT (OUTPATIENT)
Dept: ORTHOPEDIC SURGERY | Age: 47
End: 2022-06-14
Payer: OTHER GOVERNMENT

## 2022-06-14 VITALS
HEIGHT: 68 IN | WEIGHT: 175 LBS | HEART RATE: 72 BPM | OXYGEN SATURATION: 98 % | TEMPERATURE: 98.1 F | BODY MASS INDEX: 26.52 KG/M2

## 2022-06-14 DIAGNOSIS — S83.282A TEAR OF LATERAL MENISCUS OF LEFT KNEE, CURRENT, UNSPECIFIED TEAR TYPE, INITIAL ENCOUNTER: Primary | ICD-10-CM

## 2022-06-14 PROCEDURE — 99024 POSTOP FOLLOW-UP VISIT: CPT | Performed by: PHYSICIAN ASSISTANT

## 2022-06-14 NOTE — PROGRESS NOTES
Patient: Marilou Rodriguez  YOB: 1975       HISTORY:  The patient presents for reevaluation of her left knee status post arthroscopic partial lateral menisectomy on 5/2/22. Patient is improved, states pain is a 2 out of 10. Biggest complaint is intermittent swelling. she has gone to physical therapy. Patient denies any fever, chills, chest pain, shortness of breath or calf pain. The remainder of the review of systems is negative. There are no new illness or injuries to report since last seen in the office. No changes in medications, allergies, social or family history. PHYSICAL EXAMINATION:    Visit Vitals  Pulse 72   Temp 98.1 °F (36.7 °C)   Ht 5' 8\" (1.727 m)   Wt 175 lb (79.4 kg)   SpO2 98%   BMI 26.61 kg/m²     The patient is a well-developed, well-nourished female in no acute distress. The patient is alert and oriented times three. The patient appears to be well groomed. Mood and affect are normal.   ORTHOPEDIC EXAM of Left knee: Inspection: Effusion present,  incisions well healed  TTP: none  Range of motion: 0-120 flexion  Stability: Stable  Strength: 5/5  2+ distal pulses    IMPRESSION:  Status post Left knee arthroscopic partial lateral menisectomy. PLAN:   1. Patient is overall improving postoperatively. We will continue with physical therapy. She is going to minimize her activities right now as I think she is slightly overdoing it unintentionally. We are going to reevaluate in 2 weeks to make sure that her swelling is going down.     KALLI [facility]

## 2022-06-15 ENCOUNTER — HOSPITAL ENCOUNTER (OUTPATIENT)
Dept: PHYSICAL THERAPY | Age: 47
Discharge: HOME | End: 2022-06-15
Payer: OTHER GOVERNMENT

## 2022-06-15 PROCEDURE — 97112 NEUROMUSCULAR REEDUCATION: CPT

## 2022-06-15 PROCEDURE — 97530 THERAPEUTIC ACTIVITIES: CPT

## 2022-06-15 PROCEDURE — 97016 VASOPNEUMATIC DEVICE THERAPY: CPT

## 2022-06-15 PROCEDURE — 97110 THERAPEUTIC EXERCISES: CPT

## 2022-06-15 NOTE — PROGRESS NOTES
PT DAILY TREATMENT NOTE    Patient Name: Yennifer Summers  Date:6/15/2022  : 1975  [x]  Patient  Verified  Payor:  / Plan: Samm Baldwin 74 / Product Type:  /    In time:1046  Out time:1139  Total Treatment Time (min): 53  Total Timed Codes (min): 43  1:1 Treatment Time (MC/BCBS only): 43   Visit #: 7 of 16    Treatment Dx: Left knee pain [M25.562]    SUBJECTIVE  Pain Level (0-10 scale): 2  Any medication changes, allergies to medications, adverse drug reactions, diagnosis change, or new procedure performed?: [x] No    [] Yes (see summary sheet for update)  Subjective functional status/changes:   [] No changes reported  Pt reports of no changes since last visit.     OBJECTIVE    Modalities Rationale:     decrease edema, decrease inflammation, decrease pain and increase tissue extensibility to improve patient's ability to return PLOF   min [] Estim, type/location:                                      []  att     []  unatt     []  w/US     []  w/ice    []  w/heat    min []  Mechanical Traction: type/lbs                   []  pro   []  sup   []  int   []  cont    []  before manual    []  after manual    min []  Ultrasound, settings/location:      min []  Iontophoresis w/ dexamethasone, location:                                               []  take home patch       []  in clinic    min []  Ice     []  Heat    location/position:    10 min [x]  Vasopneumatic Device, press/temp: Left knee/ medium/34 deg   If using vaso (only need to measure limb vaso being performed on)      pre-treatment girth : 37 cm      post-treatment girth : 36 cm      measured at (landmark location) :  Mid-patella    min []  Other:    [x] Skin assessment post-treatment (if applicable):    [x]  intact    []  redness- no adverse reaction                  []redness - adverse reaction:        20 min Therapeutic Exercise:  [x] See flow sheet :   Rationale: increase ROM, increase strength, improve coordination and improve balance to improve the patients ability to return PLOF    10 min Therapeutic Activity:  [x]  See flow sheet :   Rationale: increase ROM, increase strength, improve coordination and improve balance  to improve the patients ability to perform ADLs and reduce pain     13 min Neuromuscular Re-education:  [x]  See flow sheet :   Rationale: increase ROM, increase strength, improve coordination and improve balance  to improve the patients ability to perform ADLs and reduce pain       With   [] TE   [] TA   [] neuro   [] other: Patient Education: [x] Review HEP    [] Progressed/Changed HEP based on:   [] positioning   [] body mechanics   [] transfers   [] heat/ice application    [] other:      Other Objective/Functional Measures:     -cues to maintain PPT with bridge    Pain Level (0-10 scale) post treatment: 0    ASSESSMENT/Changes in Function:   Patient tolerated treatment session well today. Cues to hold 5-10 seconds with each repetition. Pt continues to have persistent pain when prolonged sitting or walking. Patient continues to make steady progress toward goals and would benefit from continued skilled PT intervention to address remaining deficits outlined in goals below. Patient will continue to benefit from skilled PT services to modify and progress therapeutic interventions, address functional mobility deficits, address ROM deficits, address strength deficits, analyze and address soft tissue restrictions, analyze and cue movement patterns, analyze and modify body mechanics/ergonomics and assess and modify postural abnormalities to attain remaining goals. [x]  See Plan of Care  []  See progress note/recertification  []  See Discharge Summary         Progress towards goals / Updated goals:  Short Term Goals:   To be accomplished in 2 weeks: 1. Patient will report compliance with HEP 1x/day to aid in rehabilitation program.  Status at IE: Provided pt with HEP and pt appeared to understand.    Current 6/15/22: pt reports of complaint with HEP daily. IN PROGRESS     2.Patient will rate pain on greater than 4/10 so pt can perform ADL's. Status at IE:Pt rates pain _5__/10 max (in the last 48 hrs) __1_/10 min (in the last 48 hrs) _3__/10 currently at rest   Current 6/7/22: pt arrived to therapy with a 3/10 pain, ended with 2/10     Long Term Goals: To be accomplished in 8 weeks: 1. Patient will increase limited Bilateral LE strength by at least . 5 grade MMT throughout so pt can perform stairs. Status at IE:    Left (0-5) Right (0-5) N/T   Knee extension 3- 5 []??????    Knee flexion 4- 5 []??????    Ankle Dorsiflexion (L4) 5 5 []??????    Ankle Plantarflexion (S1)  #heel raises     [x]??????    Hip flexion 3-  Extensor lag 4 []??????    Hip abduction 4- 4- []??????    Hip ER 3 4- []??????    Hip IR 3 4- []??????    Hip extension 3- 3+ []??????    Glute Max 3- 3- []??????       Current: 6/7/22 pt with difficulty with performing bridges     2.Patient will report pain no greater than 1-2/10 throughout entire day to aid in completion of ADLs. Status at IE:Pt rates pain _5__/10 max (in the last 48 hrs) __1_/10 min (in the last 48 hrs) _3__/10 currently at rest   Current: 6/7/22 see STG     3. Patient will 0-140 degrees of right knee ROM so pt can  objects off the ground. Status at IE:   Knee AROM Left Right   Extension Lacking 10 0   Flexion 135 145   Current: Knee ROM 2-137 deg progressing 6/13/22     4. Patient will improve FOTO score to 71 points to demonstrate improvement in functional status.   Status at IE:54  Current: 6/8/22 54  *FOTO score is an established functional score where 100 = no disability*     5. Pt will have 5-10 deg inc in hip ROM so pt can don/doff shoes  Status at IE:   Hip PROM Left Right   Flexion 80 80   ER 25 32   IR 32 30   Current: 6/7/22 same as IE           PLAN  []  Upgrade activities as tolerated     [x]  Continue plan of care  []  Update interventions per flow sheet       []  Discharge due to:_  []  Other:_      Bryn Castillo, PTA 6/15/2022  8:25 AM    Future Appointments   Date Time Provider Amanda Mikaela   6/15/2022 10:45 AM Joanna Sandhoff, PTA [facility]   6/28/2022 11:15 AM JAVIER Acuña [facility]   7/1/2022 10:45 AM Guero Shelton, PT [facility]   7/5/2022 10:45 AM Guero Shelton, PT [facility]   7/7/2022 10:45 AM Guero Shelton, PT [facility]   7/12/2022 10:45 AM Guero Shelton, PT [facility]   7/14/2022 10:45 AM Guero Shelton, PT [facility]   7/19/2022 10:45 AM Guero Shelton, PT [facility]   7/21/2022 10:45 AM Guero Shelton, PT [facility]

## 2022-06-20 ENCOUNTER — TELEPHONE (OUTPATIENT)
Dept: PHYSICAL THERAPY | Age: 47
End: 2022-06-20

## 2022-06-22 ENCOUNTER — HOSPITAL ENCOUNTER (OUTPATIENT)
Dept: PHYSICAL THERAPY | Age: 47
Discharge: HOME | End: 2022-06-22
Payer: OTHER GOVERNMENT

## 2022-06-22 PROCEDURE — 97110 THERAPEUTIC EXERCISES: CPT

## 2022-06-22 PROCEDURE — 97112 NEUROMUSCULAR REEDUCATION: CPT

## 2022-06-22 PROCEDURE — 97530 THERAPEUTIC ACTIVITIES: CPT

## 2022-06-22 PROCEDURE — 97016 VASOPNEUMATIC DEVICE THERAPY: CPT

## 2022-06-22 NOTE — PROGRESS NOTES
PT DAILY TREATMENT NOTE    Patient Name: Luca Credit  Date:2022  : 1975  [x]  Patient  Verified  Payor:  / Plan: Samm Baldwin 76 / Product Type:  /    In time:1047  Out time:1150  Total Treatment Time (min): 63  Total Timed Codes (min): 53  1:1 Treatment Time (MC/BCBS only): 48   Visit #: 8 of 16    Treatment Dx: Left knee pain [M25.562]    SUBJECTIVE  Pain Level (0-10 scale): 0  Any medication changes, allergies to medications, adverse drug reactions, diagnosis change, or new procedure performed?: [x] No    [] Yes (see summary sheet for update)  Subjective functional status/changes:   [] No changes reported  Pt reports of feeling better since last visit.     OBJECTIVE    Modalities Rationale:     decrease edema, decrease inflammation and decrease pain to improve patient's ability to return PLOF   min [] Estim, type/location:                                      []  att     []  unatt     []  w/US     []  w/ice    []  w/heat    min []  Mechanical Traction: type/lbs                   []  pro   []  sup   []  int   []  cont    []  before manual    []  after manual    min []  Ultrasound, settings/location:      min []  Iontophoresis w/ dexamethasone, location:                                               []  take home patch       []  in clinic    min []  Ice     []  Heat    location/position:    10 min [x]  Vasopneumatic Device, press/temp: Left knee/ medium/ 34 degrees   If using vaso (only need to measure limb vaso being performed on)      pre-treatment girth : 37 cm      post-treatment girth : 36.5 cm      measured at (landmark location) :  Mid-patella    min []  Other:    [x] Skin assessment post-treatment (if applicable):    [x]  intact    []  redness- no adverse reaction                  []redness - adverse reaction:      20 min Therapeutic Exercise:  [x] See flow sheet :   Rationale: increase ROM, increase strength, improve coordination and improve balance to improve the patients ability to return PLOF    8 min Therapeutic Activity:  [x]  See flow sheet :   Rationale: increase ROM, increase strength, improve coordination and improve balance  to improve the patients ability to perform ADLs without pain and symptom levels      25 min Neuromuscular Re-education:  [x]  See flow sheet :   Rationale: increase ROM, increase strength, improve coordination and improve balance  to improve the patients ability to perform ADLs without pain and     With   [] TE   [] TA   [] neuro   [] other: Patient Education: [x] Review HEP    [] Progressed/Changed HEP based on:   [] positioning   [] body mechanics   [] transfers   [] heat/ice application    [] other:      Other Objective/Functional Measures:    -Slight pain to left knee with bridges    Pain Level (0-10 scale) post treatment: 0    ASSESSMENT/Changes in Function:   Patient tolerated treatment session well today. Pt has improved with MMT bilateral LE since initial elevation. Cues to self pace and hold 5 seconds with each repetition. Patient continues to make steady progress toward goals and would benefit from continued skilled PT intervention to address remaining deficits outlined in goals below. Patient will continue to benefit from skilled PT services to modify and progress therapeutic interventions, address functional mobility deficits, address ROM deficits, address strength deficits, analyze and address soft tissue restrictions, analyze and cue movement patterns, analyze and modify body mechanics/ergonomics and assess and modify postural abnormalities to attain remaining goals. [x]  See Plan of Care  []  See progress note/recertification  []  See Discharge Summary         Progress towards goals / Updated goals:  Short Term Goals:   To be accomplished in 2 weeks: 1. Patient will report compliance with HEP 1x/day to aid in rehabilitation program.  Status at IE: Provided pt with HEP and pt appeared to understand.    Current 6/15/22: pt reports of complaint with HEP daily. IN PROGRESS     2.Patient will rate pain on greater than 4/10 so pt can perform ADL's. Status at IE:Pt rates pain _5__/10 max (in the last 48 hrs) __1_/10 min (in the last 48 hrs) _3__/10 currently at rest   Current 6/22/22: 4/10 pain at worst with ADLs. IN PROGRESS     Long Term Goals: To be accomplished in 8 weeks: 1. Patient will increase limited Bilateral LE strength by at least . 5 grade MMT throughout so pt can perform stairs. Status at IE:    Left (0-5) Right (0-5) N/T   Knee extension 3- 5 []???????    Knee flexion 4- 5 []???????    Ankle Dorsiflexion (L4) 5 5 []???????    Ankle Plantarflexion (S1)  #heel raises     [x]???????    Hip flexion 3-  Extensor lag 4 []???????    Hip abduction 4- 4- []???????    Hip ER 3 4- []???????    Hip IR 3 4- []???????    Hip extension 3- 3+ []???????    Glute Max 3- 3- []???????       CURRENT 6/22/22: IN PROGRESS      Left (0-5) Right (0-5) N/T   Knee extension 4-p! 5 []???????    Knee flexion 4+ 5 []???????    Ankle Dorsiflexion (L4) 5 5 []???????    Ankle Plantarflexion (S1)  #heel raises     [x]???????    Hip flexion 4-  Extensor lag 4+ []???????    Hip abduction 4+ 5 []???????    Hip ER 3p! 5 []???????    Hip IR 3 4+ []???????    Hip extension 4- 5 []???????    Glute Max 5 5 []???????            2. Patient will report pain no greater than 1-2/10 throughout entire day to aid in completion of ADLs. Status at IE:Pt rates pain _5__/10 max (in the last 48 hrs) __1_/10 min (in the last 48 hrs) _3__/10 currently at rest   Current: 6/7/22 see STG     3. Patient will 0-140 degrees of right knee ROM so pt can  objects off the ground. Status at IE:   Knee AROM Left Right   Extension Lacking 10 0   Flexion 135 145   Current: Knee ROM 2-137 deg progressing 6/13/22     4. Patient will improve FOTO score to 71 points to demonstrate improvement in functional status.   Status at IE:54  Current: 6/8/22 54  *FOTO score is an established functional score where 100 = no disability*     5. Pt will have 5-10 deg inc in hip ROM so pt can don/doff shoes  Status at IE:   Hip PROM Left Right   Flexion 80 80   ER 25 32   IR 32 30   Current: 6/7/22 same as IE                 PLAN  []  Upgrade activities as tolerated     [x]  Continue plan of care  []  Update interventions per flow sheet       []  Discharge due to:_  []  Other:_      Lisa Kingsley PTA 6/22/2022  8:24 AM    Future Appointments   Date Time Provider Amanda Al   6/22/2022 10:45 AM Katherine Oswald PTA [facility]   6/28/2022 10:00 AM Karyna Rankin [facility]   6/28/2022 11:15 AM JAVIER Pulido [facility] AM[facility]   7/1/2022 10:45 AM Christina Muñoz, PT [facility]   7/5/2022 10:45 AM Christina Muñoz, PT [facility]   7/7/2022 10:45 AM Christina Muñoz, PT [facility]   7/12/2022 10:45 AM Christina Muñoz, PT [facility]   7/14/2022 10:45 AM Christina Muñoz, PT [facility]   7/19/2022 10:45 AM Christina Muñoz, PT [facility]   7/21/2022 10:45 AM Christina Muñoz, PT [facility]

## 2022-06-24 ENCOUNTER — HOSPITAL ENCOUNTER (OUTPATIENT)
Dept: PHYSICAL THERAPY | Age: 47
Discharge: HOME | End: 2022-06-24
Payer: OTHER GOVERNMENT

## 2022-06-24 PROCEDURE — 97016 VASOPNEUMATIC DEVICE THERAPY: CPT

## 2022-06-24 PROCEDURE — 97112 NEUROMUSCULAR REEDUCATION: CPT

## 2022-06-24 PROCEDURE — 97110 THERAPEUTIC EXERCISES: CPT

## 2022-06-24 PROCEDURE — 97530 THERAPEUTIC ACTIVITIES: CPT

## 2022-06-24 NOTE — PROGRESS NOTES
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 OBJECTIVE    Modalities Rationale:     decrease edema, decrease inflammation and decrease pain to improve patient's ability to return PLOF                min []? Estim, type/location:                                                            []?  att     []?  unatt     []?  w/US     []?  w/ice    []?  w/heat     min []? Mechanical Traction: type/lbs                    []?  pro   []?  sup   []? int   []?  cont    []? before manual    []? after manual     min []? Ultrasound, settings/location:        min []? Iontophoresis w/ dexamethasone, location:                                                []?  take home patch       []? in clinic     min []? Ice     []? Heat    location/position:     10 min [x]? Vasopneumatic Device, press/temp: Left knee/ medium/ 34 degrees   If using vaso (only need to measure limb vaso being performed on)      pre-treatment girth : 39 cm      post-treatment girth : 38.5 cm      measured at (landmark location) :  Mid-patella     min []? Other:     [x]? Skin assessment post-treatment (if applicable):    [x]? intact    []?   redness- no adverse reaction                  []?redness - adverse reaction:         24 min Therapeutic Exercise:  [x] See flow sheet : proximal hip strengthening and TKE activation. Introduced leg press   Rationale: increase ROM and increase strength to improve the patients ability to perform ADLs and  tasks with decreased strain on left knee    9 min Therapeutic Activity:  [x]  See flow sheet : HEP verbal review    Rationale: increase ROM, increase strength and improve coordination  to improve the patients ability to self-manage symptoms outside of PT     10 min Neuromuscular Re-education:  [x]  See flow sheet : single leg balance activities at parallel bars with multi-direction reaching and unstable surfaces   Rationale: improve coordination, improve balance and increase proprioception  to improve the patients ability to negotiate stairs with improved safety and decreased strain on left knee          With   [] TE   [x] TA   [] neuro   [] other: Patient Education: [x] Review HEP    [] Progressed/Changed HEP based on:   [] positioning   [] body mechanics   [] transfers   [] heat/ice application    [] other:        Pain Level (0-10 scale) post treatment: 1    ASSESSMENT/Changes in Function: Introduced leg press and progressed resistance with TKE strengthening for improved knee stability with stair negotiation. Patient will continue to benefit from skilled PT services to modify and progress therapeutic interventions, address functional mobility deficits, address ROM deficits, address strength deficits, analyze and cue movement patterns, analyze and modify body mechanics/ergonomics and assess and modify postural abnormalities to attain remaining goals. [x]  See Plan of Care  []  See progress note/recertification  []  See Discharge Summary         Progress towards goals / Updated goals:  Short Term Goals:   To be accomplished in 2 weeks: 1. Patient will report compliance with HEP 1x/day to aid in rehabilitation program.  Status at IE: Provided pt with HEP and pt appeared to understand.   Current 6/15/22: pt reports of complaint with HEP daily. IN PROGRESS     2.Patient will rate pain on greater than 4/10 so pt can perform ADL's. Status at IE:Pt rates pain _5__/10 max (in the last 48 hrs) __1_/10 min (in the last 48 hrs) _3__/10 currently at rest   Current 6/22/22: 4/10 pain at worst with ADLs. IN PROGRESS     Long Term Goals: To be accomplished in 8 weeks: 1. Patient will increase limited Bilateral LE strength by at least . 5 grade MMT throughout so pt can perform stairs. Status at IE:    Left (0-5) Right (0-5) N/T   Knee extension 3- 5 []????????    Knee flexion 4- 5 []????????    Ankle Dorsiflexion (L4) 5 5 []????????    Ankle Plantarflexion (S1)  #heel raises     [x]????????    Hip flexion 3-  Extensor lag 4 []????????    Hip abduction 4- 4- []????????    Hip ER 3 4- []????????    Hip IR 3 4- []????????    Hip extension 3- 3+ []????????    Glute Max 3- 3- []????????       CURRENT 6/22/22: IN PROGRESS       Left (0-5) Right (0-5) N/T   Knee extension 4-p! 5 []????????    Knee flexion 4+ 5 []????????    Ankle Dorsiflexion (L4) 5 5 []????????    Ankle Plantarflexion (S1)  #heel raises     [x]????????    Hip flexion 4-  Extensor lag 4+ []????????    Hip abduction 4+ 5 []????????    Hip ER 3p! 5 []????????    Hip IR 3 4+ []????????    Hip extension 4- 5 []????????    Glute Max 5 5 []????????             2. Patient will report pain no greater than 1-2/10 throughout entire day to aid in completion of ADLs. Status at IE:Pt rates pain _5__/10 max (in the last 48 hrs) __1_/10 min (in the last 48 hrs) _3__/10 currently at rest   Current: 6/7/22 see STG     3. Patient will 0-140 degrees of right knee ROM so pt can  objects off the ground. Status at IE:   Knee AROM Left Right   Extension Lacking 10 0   Flexion 135 145   Current: Knee ROM 2-137 deg progressing 6/13/22     4. Patient will improve FOTO score to 71 points to demonstrate improvement in functional status.   Status at IE:54  Current: 6/8/22 54  *FOTO score is an established functional score where 100 = no disability*     5. Pt will have 5-10 deg inc in hip ROM so pt can don/doff shoes  Status at IE:   Hip PROM Left Right   Flexion 80 80   ER 25 32   IR 32 30   Current: 6/7/22 same as IE        PLAN  []  Upgrade activities as tolerated     [x]  Continue plan of care  []  Update interventions per flow sheet       []  Discharge due to:_  []  Other:_      Kasia Cross, PT 6/24/2022  1:27 PM    Future Appointments   Date Time Provider Amanda Al   6/24/2022  2:15 PM Gurwinder Mills PT [facility]   6/28/2022 10:00 AM Laya Rankin [facility]   6/28/2022 11:15 AM JAVIER Roach [facility] AMB   7/1/2022 10:45 AM Puneet Mustcortez, PT [facility]   7/5/2022 10:45 AM [place] Mustard, PT [facility]   7/7/2022 10:45 AM [place] Mustard, PT [facility]   7/12/2022 10:45 AM [place] Mustard, PT [facility]   7/14/2022 10:45 AM Puneet Mustard, PT [facility]   7/19/2022 10:45 AM Puneet Mustard, PT [facility]   7/21/2022 10:45 AM [place] Mustard, PT [facility]

## 2022-06-28 ENCOUNTER — HOSPITAL ENCOUNTER (OUTPATIENT)
Dept: PHYSICAL THERAPY | Age: 47
Discharge: HOME | End: 2022-06-28
Payer: OTHER GOVERNMENT

## 2022-06-28 ENCOUNTER — OFFICE VISIT (OUTPATIENT)
Dept: ORTHOPEDIC SURGERY | Age: 47
End: 2022-06-28
Payer: OTHER GOVERNMENT

## 2022-06-28 VITALS — OXYGEN SATURATION: 99 % | BODY MASS INDEX: 26.61 KG/M2 | HEIGHT: 68 IN | TEMPERATURE: 97.7 F | HEART RATE: 85 BPM

## 2022-06-28 DIAGNOSIS — S83.282A TEAR OF LATERAL MENISCUS OF LEFT KNEE, CURRENT, UNSPECIFIED TEAR TYPE, INITIAL ENCOUNTER: Primary | ICD-10-CM

## 2022-06-28 PROCEDURE — 97110 THERAPEUTIC EXERCISES: CPT

## 2022-06-28 PROCEDURE — 97112 NEUROMUSCULAR REEDUCATION: CPT

## 2022-06-28 PROCEDURE — 99024 POSTOP FOLLOW-UP VISIT: CPT | Performed by: PHYSICIAN ASSISTANT

## 2022-06-28 PROCEDURE — 97530 THERAPEUTIC ACTIVITIES: CPT

## 2022-06-28 NOTE — PROGRESS NOTES
PT DAILY TREATMENT NOTE    Patient Name: Brody Gordon  Date:2022  : 1975  [x]  Patient  Verified  Payor:  / Plan: Samm Baldwin 74 / Product Type:  /    In time: 10:02  Out time: 10:46  Total Treatment Time (min): 44  Total Timed Codes (min): 39  1:1 Treatment Time (MC/BCBS only): 39   Visit #: 10 of 16    Treatment Dx: Left knee pain [M25.562]    SUBJECTIVE  Pain Level (0-10 scale): 1  Any medication changes, allergies to medications, adverse drug reactions, diagnosis change, or new procedure performed?: [x] No    [] Yes (see summary sheet for update)  Subjective functional status/changes:   [] No changes reported  Pt reports that she feels she has been progressing since beginning therapy. Pt still reports having trouble going up and down the stairs.      OBJECTIVE    Modalities Rationale:     decrease edema, decrease inflammation and decrease pain to improve patient's ability to perform daily activities with decreased pain and symptom levels     min [] Estim, type/location:                                      []  att     []  unatt     []  w/US     []  w/ice    []  w/heat    min []  Mechanical Traction: type/lbs                   []  pro   []  sup   []  int   []  cont    []  before manual    []  after manual    min []  Ultrasound, settings/location:      min []  Iontophoresis w/ dexamethasone, location:                                               []  take home patch       []  in clinic    min []  Ice     []  Heat    location/position:    5 min [x]  Vasopneumatic Device, press/temp: Supine left knee with bolster under legs    If using vaso (only need to measure limb vaso being performed on)      pre-treatment girth : 39.5 cm      post-treatment girth : 38 cm       measured at (landmark location) : joint line     min []  Other:    [] Skin assessment post-treatment (if applicable):    []  intact    []  redness- no adverse reaction                  []redness - adverse reaction:        8 min Therapeutic Exercise:  [x] See flow sheet :   Rationale: increase ROM and increase strength to improve the patients ability to perform daily activities with decreased pain and symptom levels    8 min Therapeutic Activity:  [x]  See flow sheet :   Rationale: increase ROM and increase strength  to improve the patients ability to perform daily activities with decreased pain and symptom levels     23 min Neuromuscular Re-education:  [x]  See flow sheet :   Rationale: increase strength, improve coordination and increase proprioception  to improve the patients ability to perform daily activities with decreased pain and symptom levels              With   [] TE   [] TA   [] neuro   [] other: Patient Education: [x] Review HEP    [] Progressed/Changed HEP based on:   [] positioning   [] body mechanics   [] transfers   [] heat/ice application    [] other:      Other Objective/Functional Measures:    Knee AROM Left Right   Extension -7  -2 after intervention 3 hyper   Flexion 135 135     Hip PROM Left Right   Flexion 130 123   ER 26 29   IR 27 33       Pain Level (0-10 scale) post treatment: 1    ASSESSMENT/Changes in Function: Nicole Lawler is a 41 y.o.  yo female who presented to [facility] diagnosed with tear of lateral meniscus of left knee. Patient is s/p left knee arthroscopic partial lateral menisectomy on 5/2/22. Pt has attended 10 sessions focusing on improving knee and hip ROM, strengthening, postural awareness, lumbopelvic positioning, and pain reduction. Pt reports max pain 5/10 but frequency has decreased with most difficulty navigating stairs. Pt's hip and knee ROM has improved, however is still limited. Pt responding well to repositioning exercises with improved knee extension post, but still needing tactile cues with maintaining PPT. Pt still challenged with maintaining heel strike with gait as well.  Pt will benefit from continuing skilled PT to address the remaining unmet goals to allow pt to complete ADLs with ease. Patient will continue to benefit from skilled PT services to modify and progress therapeutic interventions, address functional mobility deficits, address ROM deficits, address strength deficits, analyze and cue movement patterns, analyze and modify body mechanics/ergonomics and instruct in home and community integration to attain remaining goals. [x]  See Plan of Care  []  See progress note/recertification  []  See Discharge Summary         Progress towards goals / Updated goals:  Short Term Goals:   To be accomplished in 2 weeks: 1. Patient will report compliance with HEP 1x/day to aid in rehabilitation program.  Status at IE: Provided pt with HEP and pt appeared to understand.   Current 6/28/22: pt reports compliant with HEP daily. IN PROGRESS      2.Patient will rate pain on greater than 4/10 so pt can perform ADL's. Status at IE:Pt rates pain _5__/10 max (in the last 48 hrs) __1_/10 min (in the last 48 hrs) _3__/10 currently at rest   Current: 5/10 max pain but not happening as often progressing 6/28/22     Long Term Goals: To be accomplished in 8 weeks: 1. Patient will increase limited Bilateral LE strength by at least . 5 grade MMT throughout so pt can perform stairs.   Status at IE:    Left (0-5) Right (0-5) N/T   Knee extension 3- 5 []?????????    Knee flexion 4- 5 []?????????    Ankle Dorsiflexion (L4) 5 5 []?????????    Ankle Plantarflexion (S1)  #heel raises     [x]?????????    Hip flexion 3-  Extensor lag 4 []?????????    Hip abduction 4- 4- []?????????    Hip ER 3 4- []?????????    Hip IR 3 4- []?????????    Hip extension 3- 3+ []?????????    Glute Max 3- 3- []?????????       CURRENT 6/22/22: IN PROGRESS       Left (0-5) Right (0-5) N/T   Knee extension 4-p! 5 []?????????    Knee flexion 4+ 5 []?????????    Ankle Dorsiflexion (L4) 5 5 []?????????    Ankle Plantarflexion (S1)  #heel raises     [x]?????????    Hip flexion 4-  Extensor lag 4+ []?????????    Hip abduction 4+ 5 []?????????    Hip ER 3p! 5 []?????????    Hip IR 3 4+ []?????????    Hip extension 4- 5 []?????????    Glute Max 5 5 []?????????             2. Patient will report pain no greater than 1-2/10 throughout entire day to aid in completion of ADLs. Status at IE:Pt rates pain _5__/10 max (in the last 48 hrs) __1_/10 min (in the last 48 hrs) _3__/10 currently at rest   Current:  5/10 max pain but not happening as often progressing 6/28/22     3. Patient will 0-140 degrees of right knee ROM so pt can  objects off the ground. Status at IE:   Knee AROM Left Right   Extension Lacking 10 0   Flexion 135 145     Current: 6/28/22 progressing  Knee AROM Left Right   Extension -7  -2 after interventions 3 hyper   Flexion 135 135        4. Patient will improve FOTO score to 71 points to demonstrate improvement in functional status.   Status at IE:54  Current: 61 Progressing 6/28/22  *FOTO score is an established functional score where 100 = no disability*     5. Pt will have 5-10 deg inc in hip ROM so pt can don/doff shoes  Status at IE:   Hip PROM Left Right   Flexion 80 80   ER 25 32   IR 32 30     Current: 6/28/22 progressing  Hip PROM Left Right   Flexion 130 123   ER 26 29   IR 27 33            PLAN  []  Upgrade activities as tolerated     [x]  Continue plan of care  []  Update interventions per flow sheet       []  Discharge due to:_  []  Other:_      HUSSEIN Perez 6/28/2022  7:54 AM    Future Appointments   Date Time Provider Amanda Al   6/28/2022 10:00 AM Mike Rankin [facility]   6/28/2022 11:15 AM JAVIER Jones [facility] BS AMB   7/1/2022 10:45 AM JASON Mccarty [facility]   7/5/2022 10:45 AM JASON Mccarty [facility]   7/7/2022 10:45 AM Hamzah Sheridan, PT [facility]   7/12/2022 10:45 AM Hamzah Sheridan, PT [facility]   7/14/2022 10:45 AM Hamzah Sheridan, PT [facility]   7/19/2022 10:45 AM Hamzah Sheridan, PT [facility]   7/21/2022 10:45 AM Hamzah Sheridan, PT [facility]

## 2022-06-28 NOTE — PROGRESS NOTES
Patient: Luann Cheema  YOB: 1975       HISTORY:  The patient presents for reevaluation of her left knee status post arthroscopic partial lateral menisectomy on 5/2/22. Patient states pain is a 2 out of 10. Biggest complaint is intermittent swelling which persists despite resting over the last 2 weeks. she has gone to physical therapy. Patient denies any fever, chills, chest pain, shortness of breath or calf pain. The remainder of the review of systems is negative. There are no new illness or injuries to report since last seen in the office. No changes in medications, allergies, social or family history. PHYSICAL EXAMINATION:    Visit Vitals  Pulse 85   Temp 97.7 °F (36.5 °C) (Temporal)   Ht 5' 8\" (1.727 m)   SpO2 99%   BMI 26.61 kg/m²     The patient is a well-developed, well-nourished female in no acute distress. The patient is alert and oriented times three. The patient appears to be well groomed. Mood and affect are normal.   ORTHOPEDIC EXAM of Left knee: Inspection: Effusion present,  incisions well healed  TTP: none  Range of motion: 0-120 flexion  Stability: Stable  Strength: 5/5  2+ distal pulses    IMPRESSION:  Status post Left knee arthroscopic partial lateral menisectomy. PLAN:   1. Patient with persistent pain postoperatively. We will order a stat MRI to rule out a stress fracture. She will continue with her physical therapy.     RTC after MRI    [facility]

## 2022-06-28 NOTE — PROGRESS NOTES
[facility] at the [address], [place], [address] [facility]  Phone: [phone]      Fax:  [phone]    Progress Note  Patient name: Edwina Silvestre Start of Care: 2022   Referral source: George Parada : 1975   Medical/Treatment Diagnosis: Left knee pain [M25.562] Onset Date:DOS: 22     Prior Hospitalization: see medical history Provider#: [id number]   Medications: Verified on Patient Summary List    Comorbidities:PMHx/Surgical Hx: []??DM []?? HTN (controlled) []? ? High cholesterol (controlled) []? ? Cancer [x]? ? 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 Patient will report compliance with HEP 1x/day to aid in rehabilitation program.  Status at IE: Provided pt with HEP and pt appeared to understand.   Current 22: pt reports compliant with HEP daily. IN PROGRESS      2.Patient will rate pain on greater than 4/10 so pt can perform ADL's. Status at IE:Pt rates pain _5__/10 max (in the last 48 hrs) __1_/10 min (in the last 48 hrs) _3__/10 currently at rest   Current: 5/10 max pain but not happening as often progressing 22     Long Term Goals: To be accomplished in 8 weeks: 1. Patient will increase limited Bilateral LE strength by at least . 5 grade MMT throughout so pt can perform stairs.   Status at IE:    Left (0-5) Right (0-5) N/T   Knee extension 3- 5 []??????????    Knee flexion 4- 5 []??????????    Ankle Dorsiflexion (L4) 5 5 []??????????    Ankle Plantarflexion (S1)  #heel raises     [x]??????????    Hip flexion 3-  Extensor lag 4 []??????????    Hip abduction 4- 4- []??????????    Hip ER 3 4- []??????????    Hip IR 3 4- []??????????    Hip extension 3- 3+ []??????????    Glute Max 3- 3- []??????????       CURRENT 6/22/22: IN PROGRESS       Left (0-5) Right (0-5) N/T   Knee extension 4-p! 5 []??????????    Knee flexion 4+ 5 []??????????    Ankle Dorsiflexion (L4) 5 5 []??????????    Ankle Plantarflexion (S1)  #heel raises     [x]??????????    Hip flexion 4-  Extensor lag 4+ []??????????    Hip abduction 4+ 5 []??????????    Hip ER 3p! 5 []??????????    Hip IR 3 4+ []??????????    Hip extension 4- 5 []??????????    Glute Max 5 5 []??????????             2. Patient will report pain no greater than 1-2/10 throughout entire day to aid in completion of ADLs. Status at IE:Pt rates pain _5__/10 max (in the last 48 hrs) __1_/10 min (in the last 48 hrs) _3__/10 currently at rest   Current:  5/10 max pain but not happening as often progressing 6/28/22     3. Patient will 0-140 degrees of right knee ROM so pt can  objects off the ground. Status at IE:   Knee AROM Left Right   Extension Lacking 10 0   Flexion 135 145      Current: 6/28/22 progressing  Knee AROM Left Right   Extension -7  -2 after interventions 3 hyper   Flexion 135 135         4. Patient will improve FOTO score to 71 points to demonstrate improvement in functional status. Status at IE:54  Current: 61 Progressing 6/28/22  *FOTO score is an established functional score where 100 = no disability*     5. Pt will have 5-10 deg inc in hip ROM so pt can don/doff shoes  Status at IE:   Hip PROM Left Right   Flexion 80 80   ER 25 32   IR 32 30      Current: 6/28/22 progressing  Hip PROM Left Right   Flexion 130 123   ER 26 29   IR 27 33            Key Functional Changes: Luke Velasquez a 41 y.o.  yo female who [facility] to [facility] diagnosed with tear of lateral meniscus of left knee. Patient is s/p left knee arthroscopic partial lateral menisectomy on 5/2/22.  Pt has attended 10 sessions focusing on improving knee and hip ROM, strengthening, postural awareness, lumbopelvic positioning, and pain reduction. Pt reports max pain 5/10 but frequency has decreased with most difficulty navigating stairs. Pt's hip and knee ROM has improved, however is still limited. Pt responding well to repositioning exercises with improved knee extension post, but still needing tactile cues with maintaining PPT. Pt still challenged with maintaining heel strike with gait as well. Pt will benefit from continuing skilled PT to address the remaining unmet goals to allow pt to complete ADLs with ease.    Updated Goals: to be achieved in 4 weeks:   See goals above  ASSESSMENT/RECOMMENDATIONS:  [x]Continue therapy per initial plan/protocol at a frequency of  2 x per week for 4 weeks  []Continue therapy with the following recommended changes:_____________________      _____________________________________________________________________  []Discontinue therapy progressing towards or have reached established goals  []Discontinue therapy due to lack of appreciable progress towards goals  []Discontinue therapy due to lack of attendance or compliance  []Await Physician's recommendations/decisions regarding therapy  []Other:________________________________________________________________    Thank you for this referral.   [facility], [facility] 6/28/2022 7:58 AM

## 2022-06-30 ENCOUNTER — HOSPITAL ENCOUNTER (OUTPATIENT)
Dept: MRI IMAGING | Age: 47
Discharge: HOME | End: 2022-06-30
Attending: PHYSICIAN ASSISTANT
Payer: OTHER GOVERNMENT

## 2022-06-30 DIAGNOSIS — S83.282A TEAR OF LATERAL MENISCUS OF LEFT KNEE, CURRENT, UNSPECIFIED TEAR TYPE, INITIAL ENCOUNTER: ICD-10-CM

## 2022-06-30 PROCEDURE — 73721 MRI JNT OF LWR EXTRE W/O DYE: CPT

## 2022-07-01 ENCOUNTER — HOSPITAL ENCOUNTER (OUTPATIENT)
Dept: PHYSICAL THERAPY | Age: 47
Discharge: HOME | End: 2022-07-01
Payer: OTHER GOVERNMENT

## 2022-07-01 PROCEDURE — 97110 THERAPEUTIC EXERCISES: CPT

## 2022-07-01 PROCEDURE — 97112 NEUROMUSCULAR REEDUCATION: CPT

## 2022-07-01 PROCEDURE — 97016 VASOPNEUMATIC DEVICE THERAPY: CPT

## 2022-07-01 PROCEDURE — 97530 THERAPEUTIC ACTIVITIES: CPT

## 2022-07-01 NOTE — PROGRESS NOTES
PT DAILY TREATMENT NOTE    Patient Name: Pricila Brown  Date:2022  : 1975  [x]  Patient  Verified  Payor:  / Plan:  Friendly / Product Type:  /    In time:10:47  Out time:11:47  Total Treatment Time (min): 60  Total Timed Codes (min): 50  1:1 Treatment Time (MC/BCBS only): 50   Visit #: 11 of 18    Treatment Dx: Left knee pain [M25.562]    SUBJECTIVE  Pain Level (0-10 scale): 0  Any medication changes, allergies to medications, adverse drug reactions, diagnosis change, or new procedure performed?: [x] No    [] Yes (see summary sheet for update)  Subjective functional status/changes:   [] No changes reported  Pt reports that she felt fine after last therapy session.      OBJECTIVE    Modalities Rationale:     decrease edema, decrease inflammation and decrease pain to improve patient's ability to perform daily activities with decreased pain and symptom levels     min [] Estim, type/location:                                      []  att     []  unatt     []  w/US     []  w/ice    []  w/heat    min []  Mechanical Traction: type/lbs                   []  pro   []  sup   []  int   []  cont    []  before manual    []  after manual    min []  Ultrasound, settings/location:      min []  Iontophoresis w/ dexamethasone, location:                                               []  take home patch       []  in clinic    min []  Ice     []  Heat    location/position:    10 min [x]  Vasopneumatic Device, press/temp: Left knee   If using vaso (only need to measure limb vaso being performed on)      pre-treatment girth : 40.3 cm      post-treatment girth : 40.0 cm      measured at (landmark location) :      min []  Other:    [] Skin assessment post-treatment (if applicable):    []  intact    []  redness- no adverse reaction                  []redness - adverse reaction:                15 min Therapeutic Exercise:  [] See flow sheet :   Rationale: increase ROM, increase strength, improve coordination, improve balance and increase proprioception to improve the patients ability to perform daily activities with decreased pain and symptom levels      10 min Therapeutic Activity:  []  See flow sheet :   Rationale: increase ROM, increase strength, improve coordination, improve balance and increase proprioception  to improve the patients ability to perform daily activities with decreased pain and symptom levels       25 min Neuromuscular Re-education:  []  See flow sheet :   Rationale: increase ROM, increase strength, improve coordination, improve balance and increase proprioception  to improve the patients ability to perform daily activities with decreased pain and symptom levels              With   [x] TE   [x] TA   [x] neuro   [] other: Patient Education: [x] Review HEP    [] Progressed/Changed HEP based on:   [x] positioning   [x] body mechanics   [] transfers   [] heat/ice application    [] other:      Other Objective/Functional Measures: Pt enters gym in no apparent distress. Pain Level (0-10 scale) post treatment: 0    ASSESSMENT/Changes in Function: Patient tolerated therapy session well as there were no adverse reactions today. Pt doing better with sidelying techniques but continues to require tactile cuing and verbal cuing to maintain PPT. Added holding on reverse squat to POC and pt required tactile cuing to maintain PPT throughout the exercise. Added sideplank to engage abs and pt had a hard time and was fatigued afterwards. Pt is progressing with therapy as indicated by pt tolerating increase in exercise repetitions and resistance. Although showing progress patient would benefit from continuation of skilled physical therapy to address the remaining limitations.        Patient will continue to benefit from skilled PT services to modify and progress therapeutic interventions, address functional mobility deficits, address ROM deficits, address strength deficits, analyze and address soft tissue restrictions, analyze and cue movement patterns, analyze and modify body mechanics/ergonomics, assess and modify postural abnormalities, address imbalance/dizziness and instruct in home and community integration to attain remaining goals. [x]  See Plan of Care  []  See progress note/recertification  []  See Discharge Summary         Progress towards goals / Updated goals:  Short Term Goals:   To be accomplished in 2 weeks: 1. Patient will report compliance with HEP 1x/day to aid in rehabilitation program.  Status at IE: Provided pt with HEP and pt appeared to understand.   Last PN 6/28/22: pt reports compliant with HEP daily. IN PROGRESS   Current: 7/1/22 not formally assessed     2. Patient will rate pain on greater than 4/10 so pt can perform ADL's. Status at IE:Pt rates pain _5__/10 max (in the last 48 hrs) __1_/10 min (in the last 48 hrs) _3__/10 currently at rest   Last PN: 5/10 max pain but not happening as often progressing 6/28/22  Current: 7/1/22 rates pain 0/10 this therapy session     Long Term Goals: To be accomplished in 8 weeks: 1. Patient will increase limited Bilateral LE strength by at least . 5 grade MMT throughout so pt can perform stairs.   Status at IE:    Left (0-5) Right (0-5) N/T   Knee extension 3- 5 []???????????    Knee flexion 4- 5 []???????????    Ankle Dorsiflexion (L4) 5 5 []???????????    Ankle Plantarflexion (S1)  #heel raises     [x]???????????    Hip flexion 3-  Extensor lag 4 []???????????    Hip abduction 4- 4- []???????????    Hip ER 3 4- []???????????    Hip IR 3 4- []???????????    Hip extension 3- 3+ []???????????    Glute Max 3- 3- []???????????       Last PN 6/28/22: Performed on 6/22/22 as follows IN PROGRESS       Left (0-5) Right (0-5) N/T   Knee extension 4-p! 5 []???????????    Knee flexion 4+ 5 []???????????    Ankle Dorsiflexion (L4) 5 5 []???????????    Ankle Plantarflexion (S1)  #heel raises     [x]???????????    Hip flexion 4-  Extensor lag 4+ []???????????    Hip abduction 4+ 5 []???????????    Hip ER 3p! 5 []???????????    Hip IR 3 4+ []???????????    Hip extension 4- 5 []???????????    Glute Max 5 5 []???????????    Current: 7/1/22 pt fatigued with right glut max and right knee to knee         2. Patient will report pain no greater than 1-2/10 throughout entire day to aid in completion of ADLs. Status at IE:Pt rates pain _5__/10 max (in the last 48 hrs) __1_/10 min (in the last 48 hrs) _3__/10 currently at rest   Last PN:  5/10 max pain but not happening as often progressing 6/28/22  Current: 7/1/22 rates pain 0/10     3. Patient will 0-140 degrees of right knee ROM so pt can  objects off the ground. Status at IE:   Knee AROM Left Right   Extension Lacking 10 0   Flexion 135 145      Last PNt: 6/28/22 progressing  Knee AROM Left Right   Extension -7  -2 after interventions 3 hyper   Flexion 135 135    Current: 7/1/22 not formally assessed     4. Patient will improve FOTO score to 71 points to demonstrate improvement in functional status.   Status at IE:54  Last PN: 61 Progressing 6/28/22  Current: 7/1/22 not formally assessed  *FOTO score is an established functional score where 100 = no disability*     5. Pt will have 5-10 deg inc in hip ROM so pt can don/doff shoes  Status at IE:   Hip PROM Left Right   Flexion 80 80   ER 25 32   IR 32 30      Last PN: 6/28/22 progressing  Hip PROM Left Right   Flexion 130 123   ER 26 29   IR 27 33      Current: 7/1/22 not formally assessed      PLAN  []  Upgrade activities as tolerated     [x]  Continue plan of care  []  Update interventions per flow sheet       []  Discharge due to:_  []  Other:_      Leopoldo Median, PT, DPT, [facility] 7/1/2022  10:55 AM    Future Appointments   Date Time Provider [place] Al   7/5/2022 10:45 AM AsimJASON Gonsalves [facility]   [facility]2022 10:30 [facility]   7/12/2022 10:45 AM JASON Saldana [facility]   7/19/2022 10:45 AM Asim Chan PT [facility]

## 2022-07-05 ENCOUNTER — HOSPITAL ENCOUNTER (OUTPATIENT)
Dept: PHYSICAL THERAPY | Age: 47
Discharge: HOME | End: 2022-07-05
Payer: OTHER GOVERNMENT

## 2022-07-05 PROCEDURE — 97110 THERAPEUTIC EXERCISES: CPT

## 2022-07-05 PROCEDURE — 97112 NEUROMUSCULAR REEDUCATION: CPT

## 2022-07-05 PROCEDURE — 97016 VASOPNEUMATIC DEVICE THERAPY: CPT

## 2022-07-05 PROCEDURE — 97530 THERAPEUTIC ACTIVITIES: CPT

## 2022-07-05 NOTE — PROGRESS NOTES
PT DAILY TREATMENT NOTE    Patient Name: Yanna Alston  Date:2022  : 1975  [x]  Patient  Verified  Payor:  / Plan: Samm Baldwin 76 / Product Type:  /    In time:10:46  Out time:11:45  Total Treatment Time (min): 59  Total Timed Codes (min): 49  1:1 Treatment Time (MC/BCBS only): 52   Visit #: 12 of 18    Treatment Dx: Left knee pain [M25.562]    SUBJECTIVE  Pain Level (0-10 scale): 0/10  Any medication changes, allergies to medications, adverse drug reactions, diagnosis change, or new procedure performed?: [x] No    [] Yes (see summary sheet for update)  Subjective functional status/changes:   [] No changes reported  Reports that she is doing HEP. Reports that the swelling has good days and bad days. Reports that it doesn't seem as tight as it was. OBJECTIVE       Modalities Rationale:     decrease edema, decrease inflammation and decrease pain to improve patient's ability to perform daily activities with decreased pain and symptom levels                   min []? Estim, type/location:                                                            []?  att     []?  unatt     []?  w/US     []?  w/ice    []?  w/heat     min []? Mechanical Traction: type/lbs                    []?  pro   []?  sup   []? int   []?  cont    []? before manual    []? after manual     min []? Ultrasound, settings/location:        min []? Iontophoresis w/ dexamethasone, location:                                                []?  take home patch       []? in clinic     min []? Ice     []? Heat    location/position:     10 min [x]? Vasopneumatic Device, press/temp: Left knee   If using vaso (only need to measure limb vaso being performed on)      pre-treatment girth : 40.6 cm      post-treatment girth : 39.9 cm      measured at (landmark location) :  mid patella     min []? Other:     []? Skin assessment post-treatment (if applicable):    []?  intact    []?   redness- no adverse reaction []?redness - adverse reaction:                     10 min Therapeutic Exercise:  []? See flow sheet :   Rationale: increase ROM, increase strength, improve coordination, improve balance and increase proprioception to improve the patients ability to perform daily activities with decreased pain and symptom levels        10 min Therapeutic Activity:  []? See flow sheet :   Rationale: increase ROM, increase strength, improve coordination, improve balance and increase proprioception  to improve the patients ability to perform daily activities with decreased pain and symptom levels        29 min Neuromuscular Re-education:  []? See flow sheet :   Rationale: increase ROM, increase strength, improve coordination, improve balance and increase proprioception  to improve the patients ability to perform daily activities with decreased pain and symptom levels                                                                       With   [x]? TE   [x]? TA   [x]? neuro   []? other: Patient Education: [x]? Review HEP    []? Progressed/Changed HEP based on:   [x]? positioning   [x]? body mechanics   []? transfers   []? heat/ice application    [x]? other: recommended compression sleeve to help with swelling      Other Objective/Functional Measures: Pt enters gym in no apparent distress. Pretest/Post  Adductor Drop Test: right: positive/midline    left: positive/midline  Lower trunk rotation (inches): Right:17/16.5  left:17.5/17   Hruska ADDuctor lift test (Score 0-5): Right: 2/2 Left: 2/2-3     Pain Level (0-10 scale) post treatment: 0     ASSESSMENT/Changes in Function: Patient tolerated therapy session well as there were no adverse reactions today. Pt had a hard time keeping SA contraction and PPT in the QP position. Pt noted to have decreased abs with attempting to perform HRUSKA adductor lift, better on the left after techniques. Pt required tactile cuing to brings ribs down and back.  Pt continues to require tactile cuing with adductor pull back. Pt is progressing with therapy as indicated by pt tolerating increase in exercise repetitions and resistance. Although showing progress patient would benefit from continuation of skilled physical therapy to address the remaining limitations.         Patient will continue to benefit from skilled PT services to modify and progress therapeutic interventions, address functional mobility deficits, address ROM deficits, address strength deficits, analyze and address soft tissue restrictions, analyze and cue movement patterns, analyze and modify body mechanics/ergonomics, assess and modify postural abnormalities, address imbalance/dizziness and instruct in home and community integration to attain remaining goals. []? See Plan of Care  []? See progress note/recertification  []? See Discharge Summary         Progress towards goals / Updated goals:  Short Term Goals:   To be accomplished in 2 weeks: 1. Patient will report compliance with HEP 1x/day to aid in rehabilitation program.  Status at IE: Provided pt with HEP and pt appeared to understand.   Last PN 6/28/22: pt reports compliant with HEP daily. IN PROGRESS   Current: 7/5/22 reports compliance, updated per chart     2. Patient will rate pain on greater than 4/10 so pt can perform ADL's. Status at IE:Pt rates pain _5__/10 max (in the last 48 hrs) __1_/10 min (in the last 48 hrs) _3__/10 currently at rest   Last PN: 5/10 max pain but not happening as often progressing 6/28/22  Current: 7/5/22 rates pain 0/10 this therapy session     Long Term Goals: To be accomplished in 8 weeks: 1. Patient will increase limited Bilateral LE strength by at least . 5 grade MMT throughout so pt can perform stairs.   Status at IE:    Left (0-5) Right (0-5) N/T   Knee extension 3- 5 []????????????    Knee flexion 4- 5 []????????????    Ankle Dorsiflexion (L4) 5 5 []????????????    Ankle Plantarflexion (S1)  #heel raises     [x]????????????  Hip flexion 3-  Extensor lag 4 []????????????    Hip abduction 4- 4- []????????????    Hip ER 3 4- []????????????    Hip IR 3 4- []????????????    Hip extension 3- 3+ []????????????    Glute Max 3- 3- []????????????       Last PN 6/28/22: Performed on 6/22/22 as follows IN PROGRESS       Left (0-5) Right (0-5) N/T   Knee extension 4-p! 5 []????????????    Knee flexion 4+ 5 []????????????    Ankle Dorsiflexion (L4) 5 5 []????????????    Ankle Plantarflexion (S1)  #heel raises     [x]????????????    Hip flexion 4-  Extensor lag 4+ []????????????    Hip abduction 4+ 5 []????????????    Hip ER 3p! 5 []????????????    Hip IR 3 4+ []????????????    Hip extension 4- 5 []????????????    Glute Max 5 5 []????????????    Current: 7/5/22 pt fatigued with right glut max and right knee to knee, performed cross reach causing strengthening of the quad         2. Patient will report pain no greater than 1-2/10 throughout entire day to aid in completion of ADLs. Status at IE:Pt rates pain _5__/10 max (in the last 48 hrs) __1_/10 min (in the last 48 hrs) _3__/10 currently at rest   Last PN:  5/10 max pain but not happening as often progressing 6/28/22  Current: 7/5/22 rates pain 0/10     3. Patient will 0-140 degrees of right knee ROM so pt can  objects off the ground. Status at IE:   Knee AROM Left Right   Extension Lacking 10 0   Flexion 135 145      Last PNt: 6/28/22 progressing  Knee AROM Left Right   Extension -7  -2 after interventions 3 hyper   Flexion 135 135    Current: 7/5/22 not assessed     4. Patient will improve FOTO score to 71 points to demonstrate improvement in functional status.   Status at IE:54  Last PN: 61 Progressing 6/28/22  Current: 7/5/22 will perform on 15th visit  *FOTO score is an established functional score where 100 = no disability*     5. Pt will have 5-10 deg inc in hip ROM so pt can don/doff shoes  Status at IE:   Hip PROM Left Right   Flexion 80 80   ER 25 32   IR 32 30      Last PN: 6/28/22 progressing  Hip PROM Left Right   Flexion 130 123   ER 26 29   IR 27 33      Current: 7/5/22 not formally assessed        PLAN  []  Upgrade activities as tolerated     [x]  Continue plan of care  []  Update interventions per flow sheet       []  Discharge due to:_  []  Other:_      Christo Fuller, PT,DPT, [facility] 7/5/2022  8:24 AM    Future Appointments   Date Time Provider [place] Al   7/5/2022 10:45 AM Dominic Encarnacion PT [facility]   7/7/2022 10:30 AM JAVIER Pond [facility]   7/8/2022 11:30 AM Renan Armijo PTA [facility]   7/12/2022 10:45 AM Dominic Shashank PT [facility]   7/19/2022 10:45 AM Dominic Encarnacion PT [facility]   7/22/2022 10:45 AM Nell Corona PT [facility]

## 2022-07-07 ENCOUNTER — OFFICE VISIT (OUTPATIENT)
Dept: ORTHOPEDIC SURGERY | Age: 47
End: 2022-07-07
Payer: OTHER GOVERNMENT

## 2022-07-07 ENCOUNTER — APPOINTMENT (OUTPATIENT)
Dept: PHYSICAL THERAPY | Age: 47
End: 2022-07-07
Payer: OTHER GOVERNMENT

## 2022-07-07 VITALS — HEIGHT: 67 IN | BODY MASS INDEX: 27.12 KG/M2 | TEMPERATURE: 97.8 F | WEIGHT: 172.8 LBS

## 2022-07-07 DIAGNOSIS — S83.282A TEAR OF LATERAL MENISCUS OF LEFT KNEE, CURRENT, UNSPECIFIED TEAR TYPE, INITIAL ENCOUNTER: Primary | ICD-10-CM

## 2022-07-07 DIAGNOSIS — M22.2X2 PATELLOFEMORAL PAIN SYNDROME OF LEFT KNEE: ICD-10-CM

## 2022-07-07 PROCEDURE — 99024 POSTOP FOLLOW-UP VISIT: CPT | Performed by: PHYSICIAN ASSISTANT

## 2022-07-07 NOTE — PROGRESS NOTES
Patient: Paulo Hirsch  YOB: 1975       HISTORY:  The patient presents for reevaluation of her left knee status post arthroscopic partial lateral menisectomy on 5/2/22. Patient states pain is a 0 out of 10. Feels like her swelling is better today. she has gone to physical therapy. Patient denies any fever, chills, chest pain, shortness of breath or calf pain. The remainder of the review of systems is negative. There are no new illness or injuries to report since last seen in the office. No changes in medications, allergies, social or family history. PHYSICAL EXAMINATION:    Visit Vitals  Temp 97.8 °F (36.6 °C) (Temporal)   Ht 5' 7\" (1.702 m)   Wt 172 lb 12.8 oz (78.4 kg)   BMI 27.06 kg/m²     The patient is a well-developed, well-nourished female in no acute distress. The patient is alert and oriented times three. The patient appears to be well groomed. Mood and affect are normal.   ORTHOPEDIC EXAM of Left knee: Inspection: Effusion not present,  incisions well healed  TTP: none  Range of motion: 0-120 flexion  Stability: Stable  Strength: 5/5  2+ distal pulses  +hamstring tightness    IMAGING: MRI of left knee read and reviewed by myself reveals: 1. Minimal signal in posterior meniscal root attachment, nonspecific and can  reflect insertional signal. No MR evidence of clearly surfacing meniscal tear.     2. Findings in keeping with postoperative changes in the medial infrapatellar  fat and about the medial retinaculum.     3. Pulsation artifact at medial patellar facet; cannot exclude an element of  patellar chondromalacia. IMPRESSION:  Status post Left knee arthroscopic partial lateral menisectomy. Encounter Diagnoses   Name Primary?  Tear of lateral meniscus of left knee, current, unspecified tear type, initial encounter Yes    Patellofemoral pain syndrome of left knee       PLAN:   1.   Patient with no evidence of stress fracture or returning to the meniscus on MRI review. Pain consistent with patellofemoral pain. We will continue with her physical therapy and hamstring stretches at home. She will continue to gradually increase her activities.   Discussed if pain becomes more persistent may need to consider viscosupplementation    RTC PRN    KALLI Simental [phone]

## 2022-07-08 ENCOUNTER — HOSPITAL ENCOUNTER (OUTPATIENT)
Dept: PHYSICAL THERAPY | Age: 47
Discharge: HOME | End: 2022-07-08
Payer: OTHER GOVERNMENT

## 2022-07-08 PROCEDURE — 97110 THERAPEUTIC EXERCISES: CPT

## 2022-07-08 PROCEDURE — 97530 THERAPEUTIC ACTIVITIES: CPT

## 2022-07-08 PROCEDURE — 97016 VASOPNEUMATIC DEVICE THERAPY: CPT

## 2022-07-08 PROCEDURE — 97112 NEUROMUSCULAR REEDUCATION: CPT

## 2022-07-08 NOTE — PROGRESS NOTES
PT DAILY TREATMENT NOTE    Patient Name: Joseph العراقي  Date:2022  : 1975  [x]  Patient  Verified  Payor:  / Plan: [facility] 76 / Product Type:  /    In time:1135  Out time:1238  Total Treatment Time (min): 63  Total Timed Codes (min): 53  Visit #: 13 of 18    Treatment Dx: Left knee pain [M25.562]    SUBJECTIVE  Pain Level (0-10 scale): 0  Any medication changes, allergies to medications, adverse drug reactions, diagnosis change, or new procedure performed?: [x] No    [] Yes (see summary sheet for update)  Subjective functional status/changes:   [x] No changes reported  Pt reports of no changes since last visit.     OBJECTIVE    Modalities Rationale:     decrease edema, decrease inflammation and decrease pain to improve patient's ability to return PLOF   min [] Estim, type/location:                                      []  att     []  unatt     []  w/US     []  w/ice    []  w/heat    min []  Mechanical Traction: type/lbs                   []  pro   []  sup   []  int   []  cont    []  before manual    []  after manual    min []  Ultrasound, settings/location:      min []  Iontophoresis w/ dexamethasone, location:                                               []  take home patch       []  in clinic    min []  Ice     []  Heat    location/position:    10 min [x]  Vasopneumatic Device, press/temp: Left knee/medium/34 degrees   If using vaso (only need to measure limb vaso being performed on)      pre-treatment girth : 36 cm      post-treatment girth : 35.5 cm      measured at (landmark location) :  Mid-patella    min []  Other:    [x] Skin assessment post-treatment (if applicable):    [x]  intact    []  redness- no adverse reaction                  []redness - adverse reaction:        15 min Therapeutic Exercise:  [x] See flow sheet :   Rationale: increase ROM, increase strength, improve coordination and improve balance to improve the patients ability to return PLOF    13 min Therapeutic Activity:  [x]  See flow sheet :   Rationale: increase ROM, increase strength, improve coordination and improve balance  to improve the patients ability to perform ADLs and reduce pain     25 min Neuromuscular Re-education:  [x]  See flow sheet :   Rationale: increase ROM, increase strength, improve coordination and improve balance  to improve the patients ability to perform ADLs       With   [] TE   [] TA   [] neuro   [] other: Patient Education: [x] Review HEP    [] Progressed/Changed HEP based on:   [] positioning   [] body mechanics   [] transfers   [] heat/ice application    [] other:      Other Objective/Functional Measures:    -cues with eccentric quadriceps control descending with stairs     Pain Level (0-10 scale) post treatment: 0    ASSESSMENT/Changes in Function:   Patient tolerated treatment session well today. Pt continues to have difficulty with stairs at home. Added stairs to improve with ascending and descending with stairs. Patient continues to make steady progress toward goals and would benefit from continued skilled PT intervention to address remaining deficits outlined in goals below. Patient will continue to benefit from skilled PT services to modify and progress therapeutic interventions, address functional mobility deficits, address ROM deficits, address strength deficits, analyze and address soft tissue restrictions, analyze and cue movement patterns, analyze and modify body mechanics/ergonomics and assess and modify postural abnormalities to attain remaining goals. [x]  See Plan of Care  []  See progress note/recertification  []  See Discharge Summary         Progress towards goals / Updated goals:  Short Term Goals:   To be accomplished in 2 weeks: 1. Patient will report compliance with HEP 1x/day to aid in rehabilitation program.  Status at IE: Provided pt with HEP and pt appeared to understand.   Last PN 6/28/22: pt reports compliant with HEP daily.  IN PROGRESS   Current: 7/5/22 reports compliance, updated per chart     2. Patient will rate pain on greater than 4/10 so pt can perform ADL's. Status at IE:Pt rates pain _5__/10 max (in the last 48 hrs) __1_/10 min (in the last 48 hrs) _3__/10 currently at rest   Last PN: 5/10 max pain but not happening as often progressing 6/28/22  Current: 7/2/22 rates pain 2/10 this therapy session     Long Term Goals: To be accomplished in 8 weeks: 1. Patient will increase limited Bilateral LE strength by at least . 5 grade MMT throughout so pt can perform stairs. Status at IE:    Left (0-5) Right (0-5) N/T   Knee extension 3- 5 []?????????????    Knee flexion 4- 5 []?????????????    Ankle Dorsiflexion (L4) 5 5 []?????????????    Ankle Plantarflexion (S1)  #heel raises     [x]?????????????    Hip flexion 3-  Extensor lag 4 []?????????????    Hip abduction 4- 4- []?????????????    Hip ER 3 4- []?????????????    Hip IR 3 4- []?????????????    Hip extension 3- 3+ []?????????????    Glute Max 3- 3- []?????????????       Last PN 6/28/22: Performed on 6/22/22 as follows IN PROGRESS       Left (0-5) Right (0-5) N/T   Knee extension 4-p! 5 []?????????????    Knee flexion 4+ 5 []?????????????    Ankle Dorsiflexion (L4) 5 5 []?????????????    Ankle Plantarflexion (S1)  #heel raises     [x]?????????????    Hip flexion 4-  Extensor lag 4+ []?????????????    Hip abduction 4+ 5 []?????????????    Hip ER 3p! 5 []?????????????    Hip IR 3 4+ []?????????????    Hip extension 4- 5 []?????????????    Glute Max 5 5 []?????????????    Current: 7/5/22 pt fatigued with right glut max and right knee to knee, performed cross reach causing strengthening of the quad         2. Patient will report pain no greater than 1-2/10 throughout entire day to aid in completion of ADLs.   Status at IE:Pt rates pain _5__/10 max (in the last 48 hrs) __1_/10 min (in the last 48 hrs) _3__/10 currently at rest   Last PN:  5/10 max pain but not happening as often progressing 6/28/22  Current: 7/8/22 2/10 pain at worst with ADLs. IN PROGRESS     3. Patient will 0-140 degrees of right knee ROM so pt can  objects off the ground. Status at IE:   Knee AROM Left Right   Extension Lacking 10 0   Flexion 135 145      Last PNt: 6/28/22 progressing  Knee AROM Left Right   Extension -7  -2 after interventions 3 hyper   Flexion 135 135    Current: 7/5/22 not assessed     4. Patient will improve FOTO score to 71 points to demonstrate improvement in functional status.   Status at IE:54  Last PN: 61 Progressing 6/28/22  Current: 7/5/22 will perform on 15th visit  *FOTO score is an established functional score where 100 = no disability*     5. Pt will have 5-10 deg inc in hip ROM so pt can don/doff shoes  Status at IE:   Hip PROM Left Right   Flexion 80 80   ER 25 32   IR 32 30      Last PN: 6/28/22 progressing  Hip PROM Left Right   Flexion 130 123   ER 26 29   IR 27 33      Current: 7/5/22 not formally assessed              PLAN  []  Upgrade activities as tolerated     [x]  Continue plan of care  []  Update interventions per flow sheet       []  Discharge due to:_  []  Other:_      Lashaun Agrawal PTA 7/8/2022  11:47 AM    Future Appointments   Date Time Provider Amanda Al   7/12/2022 10:45 AM MOLLY Kirk [facility]   7/14/2022 11:30 AM Vanessa Nieves PT [facility]   7/19/2022 10:45 AM JASON Dumont[facility]PKARL [facility]   7/22/2022 10:45 AM JASON FloresHPKARL [facility]

## 2022-07-12 ENCOUNTER — HOSPITAL ENCOUNTER (OUTPATIENT)
Dept: PHYSICAL THERAPY | Age: 47
Discharge: HOME | End: 2022-07-12
Payer: OTHER GOVERNMENT

## 2022-07-12 PROCEDURE — 97110 THERAPEUTIC EXERCISES: CPT

## 2022-07-12 PROCEDURE — 97530 THERAPEUTIC ACTIVITIES: CPT

## 2022-07-12 PROCEDURE — 97112 NEUROMUSCULAR REEDUCATION: CPT

## 2022-07-12 PROCEDURE — 97016 VASOPNEUMATIC DEVICE THERAPY: CPT

## 2022-07-12 NOTE — PROGRESS NOTES
PT DAILY TREATMENT NOTE    Patient Name: Bernell Councilman  Date:2022  : 1975  [x]  Patient  Verified  Payor:  / Plan: [facility]  [facility] REGION / Product Type: Marcia Kilgore /    In time:10:45  Out time:11:40  Total Treatment Time (min): 55  Total Timed Codes (min): 55  1:1 Treatment Time (MC/BCBS only): NA   Visit #: 14 of 18    Treatment Dx: Left knee pain [M25.562]    SUBJECTIVE  Pain Level (0-10 scale): 0/10  Any medication changes, allergies to medications, adverse drug reactions, diagnosis change, or new procedure performed?: [x] No    [] Yes (see summary sheet for update)  Subjective functional status/changes:   [] No changes reported  \"I don't have any pain. \"    OBJECTIVE    Modalities Rationale:     decrease edema, decrease inflammation and decrease pain to improve patient's ability to return to prior level of physical activity. 10 min [x]  Vasopneumatic Device, press/temp: Med/Low   If using vaso (only need to measure limb vaso being performed on)      pre-treatment girth : 40 cm      post-treatment girth : 39 cm      measured at (landmark location) :  Mid-patella   [x] Skin assessment post-treatment (if applicable):    []  intact    []  redness- no adverse reaction                  []redness - adverse reaction:          23 min Therapeutic Exercise:  [x] See flow sheet :   Rationale: increase ROM, increase strength and improve coordination to improve the patients ability to return to prior level of physical activity. 12 min Therapeutic Activity:  [x]  See flow sheet :   Rationale: increase ROM, increase strength and improve coordination  to improve the patients ability to return to prior level of physical activity. 10 min Neuromuscular Re-education:  [x]  See flow sheet :   Rationale: increase ROM, increase strength and improve coordination  to improve the patients ability to return to prior level of physical activity.                  With   [] TE   [] TA   [] neuro   [] other: Patient Education: [x] Review HEP    [] Progressed/Changed HEP based on:   [] positioning   [] body mechanics   [] transfers   [] heat/ice application    [] other:      Other Objective/Functional Measures:      Pain Level (0-10 scale) post treatment: 0/10    ASSESSMENT/Changes in Function: Pt arrived reporting no pain in knee. Pt reports having gone to [place] [employer] over there weekend and able to walk around with no pain. Pt reports continued compliance with HEP with no adverse affects. Pt reported no increased pain post tx. Patient will continue to benefit from skilled PT services to modify and progress therapeutic interventions, address functional mobility deficits, address ROM deficits, address strength deficits, analyze and address soft tissue restrictions, analyze and cue movement patterns, analyze and modify body mechanics/ergonomics and assess and modify postural abnormalities to attain remaining goals. [x]  See Plan of Care  []  See progress note/recertification  []  See Discharge Summary         Progress towards goals / Updated goals:  Short Term Goals:   To be accomplished in 2 weeks: 1. Patient will report compliance with HEP 1x/day to aid in rehabilitation program.  Status at IE: Provided pt with HEP and pt appeared to understand.   Last PN 6/28/22: pt reports compliant with HEP daily. IN PROGRESS   Current: 7/5/22 reports compliance, updated per chart     2. Patient will rate pain on greater than 4/10 so pt can perform ADL's. Status at IE:Pt rates pain _5__/10 max (in the last 48 hrs) __1_/10 min (in the last 48 hrs) _3__/10 currently at rest   Last PN: 5/10 max pain but not happening as often progressing 6/28/22  Current: 1/10 at worst int he past week 7/12/22     Long Term Goals: To be accomplished in 8 weeks: 1. Patient will increase limited Bilateral LE strength by at least . 5 grade MMT throughout so pt can perform stairs.   Status at IE:    Left (0-5) Right (0-5) N/T Knee extension 3- 5 []??????????????    Knee flexion 4- 5 []??????????????    Ankle Dorsiflexion (L4) 5 5 []??????????????    Ankle Plantarflexion (S1)  #heel raises     [x]??????????????    Hip flexion 3-  Extensor lag 4 []??????????????    Hip abduction 4- 4- []??????????????    Hip ER 3 4- []??????????????    Hip IR 3 4- []??????????????    Hip extension 3- 3+ []??????????????    Glute Max 3- 3- []??????????????       Last PN 6/28/22: Performed on 6/22/22 as follows IN PROGRESS       Left (0-5) Right (0-5) N/T   Knee extension 4-p! 5 []??????????????    Knee flexion 4+ 5 []??????????????    Ankle Dorsiflexion (L4) 5 5 []??????????????    Ankle Plantarflexion (S1)  #heel raises     [x]??????????????    Hip flexion 4-  Extensor lag 4+ []??????????????    Hip abduction 4+ 5 []??????????????    Hip ER 3p! 5 []??????????????    Hip IR 3 4+ []??????????????    Hip extension 4- 5 []??????????????    Glute Max 5 5 []??????????????    Current: 7/5/22 pt fatigued with right glut max and right knee to knee, performed cross reach causing strengthening of the quad         2. Patient will report pain no greater than 1-2/10 throughout entire day to aid in completion of ADLs. Status at IE:Pt rates pain _5__/10 max (in the last 48 hrs) __1_/10 min (in the last 48 hrs) _3__/10 currently at rest   Last PN:  5/10 max pain but not happening as often progressing 6/28/22  Current: 7/8/22 2/10 pain at worst with ADLs. IN PROGRESS     3. Patient will 0-140 degrees of right knee ROM so pt can  objects off the ground. Status at IE:   Knee AROM Left Right   Extension Lacking 10 0   Flexion 135 145      Last PNt: 6/28/22 progressing  Knee AROM Left Right   Extension -7  -2 after interventions 3 hyper   Flexion 135 135    Current: 7/5/22 not assessed     4. Patient will improve FOTO score to 71 points to demonstrate improvement in functional status.   Status at IE:54  Last PN: 61 Progressing 6/28/22  Current: 7/5/22 will perform on 15th visit  *FOTO score is an established functional score where 100 = no disability*     5. Pt will have 5-10 deg inc in hip ROM so pt can don/doff shoes  Status at IE:   Hip PROM Left Right   Flexion 80 80   ER 25 32   IR 32 30      Last PN: 6/28/22 progressing  Hip PROM Left Right   Flexion 130 123   ER 26 29   IR 27 33      Current: 7/5/22 not formally assessed           PLAN  [x]  Upgrade activities as tolerated     [x]  Continue plan of care  []  Update interventions per flow sheet       []  Discharge due to:_  []  Other:_      Adelia Barbosa, PTA 7/12/2022  10:59 AM    Future Appointments   Date Time Provider Amanda Al   7/14/2022 11:30 AM JASON Preston [facility]   7/19/2022 10:45 AM JASON Jacobs [facility]   7/22/2022 10:45 AM JASON Preston [facility]

## 2022-07-14 ENCOUNTER — HOSPITAL ENCOUNTER (OUTPATIENT)
Dept: PHYSICAL THERAPY | Age: 47
Discharge: HOME | End: 2022-07-14
Payer: OTHER GOVERNMENT

## 2022-07-14 ENCOUNTER — APPOINTMENT (OUTPATIENT)
Dept: PHYSICAL THERAPY | Age: 47
End: 2022-07-14
Payer: OTHER GOVERNMENT

## 2022-07-14 PROCEDURE — 97530 THERAPEUTIC ACTIVITIES: CPT

## 2022-07-14 PROCEDURE — 97110 THERAPEUTIC EXERCISES: CPT

## 2022-07-14 PROCEDURE — 97112 NEUROMUSCULAR REEDUCATION: CPT

## 2022-07-14 NOTE — PROGRESS NOTES
PT DAILY TREATMENT NOTE    Patient Name: Patrice Allen  Date:2022  : 1975  [x]  Patient  Verified  Payor:  / Plan: [facility]  [facility] REGION / Product Type:  /    In time:11:34  Out time:12:26  Total Treatment Time (min): 52  Total Timed Codes (min): 52  1:1 Treatment Time (MC/BCBS only): 42   Visit #: 15 of 18    Treatment Dx: Left knee pain [M25.562]    SUBJECTIVE  Pain Level (0-10 scale): 1  Any medication changes, allergies to medications, adverse drug reactions, diagnosis change, or new procedure performed?: [x] No    [] Yes (see summary sheet for update)  Subjective functional status/changes:   [] No changes reported  Pt reports she is still fearful of the stairs, especially if carrying something heavy. OBJECTIVE    Modalities Rationale:     decrease edema, decrease inflammation and decrease pain to improve patient's ability to return to PLOF.    min [] Estim, type/location:                                      []  att     []  unatt     []  w/US     []  w/ice    []  w/heat    min []  Mechanical Traction: type/lbs                   []  pro   []  sup   []  int   []  cont    []  before manual    []  after manual    min []  Ultrasound, settings/location:      min []  Iontophoresis w/ dexamethasone, location:                                               []  take home patch       []  in clinic   10 min [x]  Ice     []  Heat    location/position: Left knee, long sit    min []  Vasopneumatic Device, press/temp:    If using vaso (only need to measure limb vaso being performed on)      pre-treatment girth :       post-treatment girth :       measured at (landmark location) :      min []  Other:    [] Skin assessment post-treatment (if applicable):    []  intact    []  redness- no adverse reaction                  []redness - adverse reaction:      12 min Therapeutic Exercise:  [x] See flow sheet :   Rationale: increase ROM and increase strength to improve the patients ability to return to PLOF. 18 min Therapeutic Activity:  [x]  See flow sheet :   Rationale: increase ROM and increase strength  to improve the patients ability to perform painfree ADLs     12 min Neuromuscular Re-education:  [x]  See flow sheet :   Rationale: increase ROM, increase strength, improve coordination and increase proprioception  to improve the patients ability to perform pain free ADLs          With   [] TE   [] TA   [] neuro   [] other: Patient Education: [x] Review HEP    [] Progressed/Changed HEP based on:   [] positioning   [] body mechanics   [] transfers   [] heat/ice application    [] other:        Pain Level (0-10 scale) post treatment: 0    ASSESSMENT/Changes in Function: Pt with good tolerance to today's session. Pt reported sharp pain during performance of 4\" step downs, to work on eccentric quad control. Pt with good tolerance to addition of prone knee flexion in order to improve quad length. Pt is making good progress towards their goals. Pt will continue to benefit from skilled therapeutic intervention at this time to address the remaining deficits as discussed in goals below. Patient will continue to benefit from skilled PT services to modify and progress therapeutic interventions, address functional mobility deficits, address ROM deficits, address strength deficits, analyze and address soft tissue restrictions, analyze and cue movement patterns, analyze and modify body mechanics/ergonomics, assess and modify postural abnormalities and address imbalance/dizziness to attain remaining goals. [x]  See Plan of Care  []  See progress note/recertification  []  See Discharge Summary         Progress towards goals / Updated goals:  Short Term Goals:   To be accomplished in 2 weeks: 1. Patient will report compliance with HEP 1x/day to aid in rehabilitation program.  Status at IE: Provided pt with HEP and pt appeared to understand.   Last PN 6/28/22: pt reports compliant with HEP daily.  IN PROGRESS   Current: 7/5/22 reports compliance, updated per chart     2. Patient will rate pain on greater than 4/10 so pt can perform ADL's. Status at IE:Pt rates pain _5__/10 max (in the last 48 hrs) __1_/10 min (in the last 48 hrs) _3__/10 currently at rest   Last PN: 5/10 max pain but not happening as often progressing 6/28/22  Current: 1/10 at worst int he past week 7/12/22     Long Term Goals: To be accomplished in 8 weeks: 1. Patient will increase limited Bilateral LE strength by at least . 5 grade MMT throughout so pt can perform stairs. Status at IE:    Left (0-5) Right (0-5) N/T   Knee extension 3- 5 []???????????????    Knee flexion 4- 5 []???????????????    Ankle Dorsiflexion (L4) 5 5 []???????????????    Ankle Plantarflexion (S1)  #heel raises     [x]???????????????    Hip flexion 3-  Extensor lag 4 []???????????????    Hip abduction 4- 4- []???????????????    Hip ER 3 4- []???????????????    Hip IR 3 4- []???????????????    Hip extension 3- 3+ []???????????????    Glute Max 3- 3- []???????????????       Last PN 6/28/22: Performed on 6/22/22 as follows IN PROGRESS       Left (0-5) Right (0-5) N/T   Knee extension 4-p! 5 []???????????????    Knee flexion 4+ 5 []???????????????    Ankle Dorsiflexion (L4) 5 5 []???????????????    Ankle Plantarflexion (S1)  #heel raises     [x]???????????????    Hip flexion 4-  Extensor lag 4+ []???????????????    Hip abduction 4+ 5 []???????????????    Hip ER 3p! 5 []???????????????    Hip IR 3 4+ []???????????????    Hip extension 4- 5 []???????????????    Glute Max 5 5 []???????????????    Current: 7/5/22 pt fatigued with right glut max and right knee to knee, performed cross reach causing strengthening of the quad         2. Patient will report pain no greater than 1-2/10 throughout entire day to aid in completion of ADLs.   Status at IE:Pt rates pain _5__/10 max (in the last 48 hrs) __1_/10 min (in the last 48 hrs) _3__/10 currently at rest   Last PN:  5/10 max pain but not happening as often progressing 6/28/22  Current: 7/8/22 2/10 pain at worst with ADLs. IN PROGRESS     3. Patient will 0-140 degrees of right knee ROM so pt can  objects off the ground. Status at IE:   Knee AROM Left Right   Extension Lacking 10 0   Flexion 135 145      Last PNt: 6/28/22 progressing  Knee AROM Left Right   Extension -7  -2 after interventions 3 hyper   Flexion 135 135    Current: 7/5/22 not assessed     4. Patient will improve FOTO score to 71 points to demonstrate improvement in functional status.   Status at IE:54  Last PN: 61 Progressing 6/28/22  Current: 7/14/22 - 82 - MET  *FOTO score is an established functional score where 100 = no disability*     5. Pt will have 5-10 deg inc in hip ROM so pt can don/doff shoes  Status at IE:   Hip PROM Left Right   Flexion 80 80   ER 25 32   IR 32 30      Last PN: 6/28/22 progressing  Hip PROM Left Right   Flexion 130 123   ER 26 29   IR 27 33      Current: 7/5/22 not formally assessed     PLAN  []  Upgrade activities as tolerated     [x]  Continue plan of care  []  Update interventions per flow sheet       []  Discharge due to:_  []  Other:_      Yudi Galan PT 7/14/2022  7:45 AM    Future Appointments   Date Time Provider Amanda Al   7/14/2022 11:30 AM JASON Thrasher [facility]   7/19/2022 10:45 AM MOLLY Simeon [facility]   7/22/2022 10:45 AM JASON Thrasher [facility]

## 2022-07-15 ENCOUNTER — APPOINTMENT (OUTPATIENT)
Dept: PHYSICAL THERAPY | Age: 47
End: 2022-07-15
Payer: OTHER GOVERNMENT

## 2022-07-19 ENCOUNTER — HOSPITAL ENCOUNTER (OUTPATIENT)
Dept: PHYSICAL THERAPY | Age: 47
Discharge: HOME | End: 2022-07-19
Payer: OTHER GOVERNMENT

## 2022-07-19 PROCEDURE — 97112 NEUROMUSCULAR REEDUCATION: CPT

## 2022-07-19 PROCEDURE — 97110 THERAPEUTIC EXERCISES: CPT

## 2022-07-19 PROCEDURE — 97530 THERAPEUTIC ACTIVITIES: CPT

## 2022-07-19 PROCEDURE — 97016 VASOPNEUMATIC DEVICE THERAPY: CPT

## 2022-07-19 NOTE — PROGRESS NOTES
PT DAILY TREATMENT NOTE    Patient Name: Jonathon Banks  Date:2022  : 1975  [x]  Patient  Verified  Payor:  / Plan: Samm Baldwin 74 / Product Type:  /    In time:1045  Out time:1137  Total Treatment Time (min): 52  Total Timed Codes (min): 42  Visit #: 16 of 18    Treatment Dx: Left knee pain [M25.562]    SUBJECTIVE  Pain Level (0-10 scale):  2  Any medication changes, allergies to medications, adverse drug reactions, diagnosis change, or new procedure performed?: [x] No    [] Yes (see summary sheet for update)  Subjective functional status/changes:   [] No changes reported  \"a sharp pain with my knee today\"    OBJECTIVE    Modalities Rationale:     decrease edema, decrease inflammation, decrease pain and increase tissue extensibility to improve patient's ability to return PLOF   min [] Estim, type/location:                                      []  att     []  unatt     []  w/US     []  w/ice    []  w/heat    min []  Mechanical Traction: type/lbs                   []  pro   []  sup   []  int   []  cont    []  before manual    []  after manual    min []  Ultrasound, settings/location:      min []  Iontophoresis w/ dexamethasone, location:                                               []  take home patch       []  in clinic    min []  Ice     []  Heat    location/position:    10 min [x]  Vasopneumatic Device, press/temp: Left knee/ medium/ 34 degree   If using vaso (only need to measure limb vaso being performed on)      pre-treatment girth : 38 cm      post-treatment girth : 36.5cm      measured at (landmark location) :  Mid-patella     min []  Other:    [x] Skin assessment post-treatment (if applicable):    [x]  intact    []  redness- no adverse reaction                  []redness - adverse reaction:      20 min Therapeutic Exercise:  [x] See flow sheet :   Rationale: increase ROM, increase strength, improve coordination and improve balance to improve the patients ability to return PLOF    10 min Therapeutic Activity:  [x]  See flow sheet :   Rationale: increase ROM, increase strength, improve coordination and improve balance  to improve the patients ability to perform ADL's without pain and symptom levels     12 min Neuromuscular Re-education:  [x]  See flow sheet :   Rationale: increase ROM, increase strength, improve coordination and improve balance  to improve the patients ability to return to prior physical activities     With   [] TE   [] TA   [] neuro   [] other: Patient Education: [x] Review HEP    [] Progressed/Changed HEP based on:   [] positioning   [] body mechanics   [] transfers   [] heat/ice application    [] other:      Other Objective/Functional Measures:    -demonstrated left leg instability with SLS on foam   -ascend with stair with step through gait pattern; descend step to gait pattern    Pain Level (0-10 scale) post treatment: 0    ASSESSMENT/Changes in Function:   Patient tolerated treatment session well today. Added weight with stairs to improve eccentric quadriceps control with step down. Pt demonstrated ascending with step through and descending step to. Cues to maintain eccentric quadriceps as stepping down stair. Patient continues to make steady progress toward goals and would benefit from continued skilled PT intervention to address remaining deficits outlined in goals below. Patient will continue to benefit from skilled PT services to modify and progress therapeutic interventions, address functional mobility deficits, address ROM deficits, address strength deficits, analyze and address soft tissue restrictions, analyze and cue movement patterns, analyze and modify body mechanics/ergonomics and assess and modify postural abnormalities to attain remaining goals.      [x]  See Plan of Care  []  See progress note/recertification  []  See Discharge Summary         Progress towards goals / Updated goals:  Short Term Goals:   To be accomplished in 2 weeks: 1. Patient will report compliance with HEP 1x/day to aid in rehabilitation program.  Status at IE: Provided pt with HEP and pt appeared to understand.   Last PN 6/28/22: pt reports compliant with HEP daily. IN PROGRESS   Current: 7/5/22 reports compliance, updated per chart     2. Patient will rate pain on greater than 4/10 so pt can perform ADL's. Status at IE:Pt rates pain _5__/10 max (in the last 48 hrs) __1_/10 min (in the last 48 hrs) _3__/10 currently at rest   Last PN: 5/10 max pain but not happening as often progressing 6/28/22  Current: 1/10 at worst int he past week 7/12/22     Long Term Goals: To be accomplished in 8 weeks: 1. Patient will increase limited Bilateral LE strength by at least . 5 grade MMT throughout so pt can perform stairs. Status at IE:    Left (0-5) Right (0-5) N/T   Knee extension 3- 5 []????????????????    Knee flexion 4- 5 []????????????????    Ankle Dorsiflexion (L4) 5 5 []????????????????    Ankle Plantarflexion (S1)  #heel raises     [x]????????????????    Hip flexion 3-  Extensor lag 4 []????????????????    Hip abduction 4- 4- []????????????????    Hip ER 3 4- []????????????????    Hip IR 3 4- []????????????????    Hip extension 3- 3+ []????????????????    Glute Max 3- 3- []????????????????       Last PN 6/28/22: Performed on 6/22/22 as follows IN PROGRESS       Left (0-5) Right (0-5) N/T   Knee extension 4-p! 5 []????????????????    Knee flexion 4+ 5 []????????????????    Ankle Dorsiflexion (L4) 5 5 []????????????????    Ankle Plantarflexion (S1)  #heel raises     [x]????????????????    Hip flexion 4-  Extensor lag 4+ []????????????????    Hip abduction 4+ 5 []????????????????    Hip ER 3p! 5 []????????????????    Hip IR 3 4+ []????????????????    Hip extension 4- 5 []????????????????    Glute Max 5 5 []????????????????    Current: 7/5/22 pt fatigued with right glut max and right knee to knee, performed cross reach causing strengthening of the quad         2. Patient will report pain no greater than 1-2/10 throughout entire day to aid in completion of ADLs. Status at IE:Pt rates pain _5__/10 max (in the last 48 hrs) __1_/10 min (in the last 48 hrs) _3__/10 currently at rest   Last PN:  5/10 max pain but not happening as often progressing 6/28/22  Current: 7/19/22 2/10 pain at worst with ADLs. IN PROGRESS     3. Patient will 0-140 degrees of right knee ROM so pt can  objects off the ground. Status at IE:   Knee AROM Left Right   Extension Lacking 10 0   Flexion 135 145      Last PNt: 6/28/22 progressing  Knee AROM Left Right   Extension -7  -2 after interventions 3 hyper   Flexion 135 135    Current: 7/5/22 not assessed     4. Patient will improve FOTO score to 71 points to demonstrate improvement in functional status.   Status at IE:54  Last PN: 61 Progressing 6/28/22  Current: 7/14/22 - 82 - MET  *FOTO score is an established functional score where 100 = no disability*     5. Pt will have 5-10 deg inc in hip ROM so pt can don/doff shoes  Status at IE:   Hip PROM Left Right   Flexion 80 80   ER 25 32   IR 32 30      Last PN: 6/28/22 progressing  Hip PROM Left Right   Flexion 130 123   ER 26 29   IR 27 33      Current: 7/5/22 not formally assessed          PLAN  []  Upgrade activities as tolerated     [x]  Continue plan of care  []  Update interventions per flow sheet       []  Discharge due to:_  []  Other:_      Solomon Estrada PTA 7/19/2022  8:59 AM    Future Appointments   Date Time Provider Amanda Al   7/19/2022 10:45 AM Geo Jones, PTA [facility]   7/22/2022 10:45 AM Alcides Pérez, PT [facility]

## 2022-07-21 ENCOUNTER — APPOINTMENT (OUTPATIENT)
Dept: PHYSICAL THERAPY | Age: 47
End: 2022-07-21
Payer: OTHER GOVERNMENT

## 2022-07-22 ENCOUNTER — HOSPITAL ENCOUNTER (OUTPATIENT)
Dept: PHYSICAL THERAPY | Age: 47
Discharge: HOME | End: 2022-07-22
Payer: OTHER GOVERNMENT

## 2022-07-22 PROCEDURE — 97112 NEUROMUSCULAR REEDUCATION: CPT

## 2022-07-22 PROCEDURE — 97016 VASOPNEUMATIC DEVICE THERAPY: CPT

## 2022-07-22 PROCEDURE — 97110 THERAPEUTIC EXERCISES: CPT

## 2022-07-22 NOTE — PROGRESS NOTES
PT DAILY TREATMENT NOTE    Patient Name: Sourav Talbot  Date:2022  : 1975  [x]  Patient  Verified  Payor:  / Plan: Samm Baldwin 74 / Product Type:  /    In time:10:48  Out time:11:41  Total Treatment Time (min): 53  Total Timed Codes (min): 43  1:1 Treatment Time (MC/BCBS only): 50   Visit #: 17 of 18    Treatment Dx: Left knee pain [M25.562]    SUBJECTIVE  Pain Level (0-10 scale): 0  Any medication changes, allergies to medications, adverse drug reactions, diagnosis change, or new procedure performed?: [x] No    [] Yes (see summary sheet for update)  Subjective functional status/changes:   [] No changes reported  Reports decrease in sharp knee pains. OBJECTIVE    Modalities Rationale:     decrease edema, decrease inflammation, and decrease pain to improve patient's ability to ambulate with no dysfunction   min [] Estim, type/location:                                      []  att     []  unatt     []  w/US     []  w/ice    []  w/heat    min []  Mechanical Traction: type/lbs                   []  pro   []  sup   []  int   []  cont    []  before manual    []  after manual    min []  Ultrasound, settings/location:      min []  Iontophoresis w/ dexamethasone, location:                                               []  take home patch       []  in clinic    min []  Ice     []  Heat    location/position:    10 min [x]  Vasopneumatic Device, press/temp: Med, 34   If using vaso (only need to measure limb vaso being performed on)      pre-treatment girth : 38.1 cm      post-treatment girth :       measured at (landmark location) : Mid patella      min []  Other:    [] Skin assessment post-treatment (if applicable):    []  intact    []  redness- no adverse reaction                  []redness - adverse reaction:     25 min Therapeutic Exercise:  [x] See flow sheet :   Rationale: increase ROM and increase strength to improve the patients ability to return to PLOF.      18 min Neuromuscular Re-education:  [x]  See flow sheet :   Rationale: improve coordination, improve balance, increase proprioception, and motor control   to improve the patients ability to improve stability with ambulation          With   [] TE   [] TA   [] neuro   [] other: Patient Education: [x] Review HEP    [] Progressed/Changed HEP based on:   [] positioning   [] body mechanics   [] transfers   [] heat/ice application    [] other:      Other Objective/Functional Measures: See goals     Pain Level (0-10 scale) post treatment: 0    ASSESSMENT/Changes in Function: Pt with good tolerance to today's session. Pt reported initial challenge during performance of retro stairs reduced with practice. Pt with good tolerance to addition of lateral walking with resistance in order to improve glute activation. Pt is making good progress towards their goals. Pt will continue to benefit from skilled therapeutic intervention at this time to address the remaining deficits as discussed in goals below. Patient will continue to benefit from skilled PT services to modify and progress therapeutic interventions, address functional mobility deficits, address ROM deficits, address strength deficits, analyze and address soft tissue restrictions, analyze and cue movement patterns, analyze and modify body mechanics/ergonomics, assess and modify postural abnormalities, and address imbalance/dizziness to attain remaining goals. [x]  See Plan of Care  []  See progress note/recertification  []  See Discharge Summary         Progress towards goals / Updated goals:  Short Term Goals: To be accomplished in 2 weeks: 1. Patient will report compliance with HEP 1x/day to aid in rehabilitation program.  Status at IE: Provided pt with HEP and pt appeared to understand. Last PN 6/28/22: pt reports compliant with HEP daily. IN PROGRESS   Current: 7/22/22 reports 3-4 x a week compliance     2. Patient will rate pain on greater than 4/10 so pt can perform ADL's. Status at IE:Pt rates pain _5__/10 max (in the last 48 hrs) __1_/10 min (in the last 48 hrs) _3__/10 currently at rest   Last PN: 5/10 max pain but not happening as often progressing 6/28/22  Current: 2/10 at worst int he past week at the end of day 7/22/22     Long Term Goals: To be accomplished in 8 weeks: 1. Patient will increase limited Bilateral LE strength by at least . 5 grade MMT throughout so pt can perform stairs. Status at IE:    Left (0-5) Right (0-5) N/T   Knee extension 3- 5 []    Knee flexion 4- 5 []    Ankle Dorsiflexion (L4) 5 5 []    Ankle Plantarflexion (S1)  #heel raises     [x]    Hip flexion 3-  Extensor lag 4 []    Hip abduction 4- 4- []    Hip ER 3 4- []    Hip IR 3 4- []    Hip extension 3- 3+ []    Glute Max 3- 3- []       Last PN 6/28/22: Performed on 6/22/22 as follows IN PROGRESS       Left (0-5) Right (0-5) N/T   Knee extension 4-p! 5 []    Knee flexion 4+ 5 []    Ankle Dorsiflexion (L4) 5 5 []    Hip flexion 4-  Extensor lag 4+ []    Hip abduction 4+ 5 []    Hip ER 3p! 5 []    Hip IR 3 4+ []    Hip extension 4- 5 []    Glute Max 5 5 []    Current: 7/22/22    Left (0-5) Right (0-5) N/T   Knee extension 4+ 5 []    Knee flexion 4+ 5 []    Ankle Dorsiflexion (L4) 5 5 []    Hip flexion 4+ 4+ []    Hip abduction 4+ 5 []    Hip ER 4 5 []    Hip IR 3+ 4+ []    Hip extension 4- 5 []    Glute Max 5 5 []       2. Patient will report pain no greater than 1-2/10 throughout entire day to aid in completion of ADLs. Status at IE:Pt rates pain _5__/10 max (in the last 48 hrs) __1_/10 min (in the last 48 hrs) _3__/10 currently at rest   Last PN:  5/10 max pain but not happening as often progressing 6/28/22  Current: 2/10 at worst int he past week at the end of day 7/22/22     3. Patient will 0-140 degrees of right knee ROM so pt can  objects off the ground.   Status at IE:  Knee AROM Left Right   Extension Lacking 10 0   Flexion 135 145      Last PNt: 6/28/22 progressing  Knee AROM Left Right   Extension -7  -2 after interventions 3 hyper   Flexion 135 135    Current: 7/22/22   Knee AROM Left Right   Extension -5  -2 after interventions 3 hyper   Flexion 145 135     4. Patient will improve FOTO score to 71 points to demonstrate improvement in functional status. Status at IE:54  Last PN: 61 Progressing 6/28/22  Current: 7/14/22 - 82 - MET  *FOTO score is an established functional score where 100 = no disability*     5. Pt will have 5-10 deg inc in hip ROM so pt can don/doff shoes  Status at IE:  Hip PROM Left Right   Flexion 80 80   ER 25 32   IR 32 30      Last PN: 6/28/22 progressing  Hip PROM Left Right   Flexion 130 123   ER 26 29   IR 27 33      Current: 7/22/22  Hip PROM Left Right   Flexion 132 123   ER 26 29   IR 30 33       PLAN  []  Upgrade activities as tolerated     [x]  Continue plan of care  []  Update interventions per flow sheet       []  Discharge due to:_  []  Other:_      Sandy Greer, PT 7/22/2022  7:41 AM    No future appointments.

## 2022-08-02 ENCOUNTER — HOSPITAL ENCOUNTER (OUTPATIENT)
Dept: PHYSICAL THERAPY | Age: 47
Discharge: HOME | End: 2022-08-02
Payer: OTHER GOVERNMENT

## 2022-08-02 PROCEDURE — 97110 THERAPEUTIC EXERCISES: CPT

## 2022-08-02 PROCEDURE — 97112 NEUROMUSCULAR REEDUCATION: CPT

## 2022-08-02 PROCEDURE — 97530 THERAPEUTIC ACTIVITIES: CPT

## 2022-08-02 NOTE — PROGRESS NOTES
Physical Therapy Discharge Instructions  [facility] at the [address], [place], [address] [facility]  Phone: [phone]      Fax:  [phone]    Patient: Bernell Councilman  : 1975    Continue Home Exercise Program  3 times per week      Continue with    [x] Ice  as needed      [] Heat           Follow up with MD:     [] Upon completion of therapy     [x] As needed      Recommendations:     [x]   Return to activity with home program    []   Return to activity with the following modifications:       []Post Rehab Program    []Join Independent aquatic program     [x]Return to/join local gym      Additional Comments: Please contact clinic at above phone number if you have any questions regarding Home Exercise Program or self care instructions.       Ellyn Castrejon, PTA 2022 11:20 AM

## 2022-08-02 NOTE — PROGRESS NOTES
PT DAILY TREATMENT NOTE    Patient Name: Felicia Watkins  Date:2022  : 1975  [x]  Patient  Verified  Payor: YAMINI / Plan: Samm Baldwin 74 / Product Type: [address] /    In time:1045  Out time:1125  Total Treatment Time (min): 40  Total Timed Codes (min): 40  Visit #: 18 of 19    Treatment Dx: Left knee pain [M25.562]    SUBJECTIVE  Pain Level (0-10 scale): 0  Any medication changes, allergies to medications, adverse drug reactions, diagnosis change, or new procedure performed?: [x] No    [] Yes (see summary sheet for update)  Subjective functional status/changes:   [] No changes reported  \"Just stiff today\"    OBJECTIVE  15 min Therapeutic Exercise:  [x] See flow sheet :   Rationale: increase ROM, increase strength, improve coordination, and improve balance to improve the patients ability to return PLOF    15 min Therapeutic Activity:  [x]  See flow sheet :   Rationale: increase ROM, increase strength, improve coordination, and improve balance  to improve the patients ability to perform ADL's without pain and symptom levels     10 min Neuromuscular Re-education:  [x]  See flow sheet :   Rationale: increase ROM, increase strength, improve coordination, and improve balance  to improve the patients ability to perform ADL's without pain and system levels    With   [] TE   [] TA   [] neuro   [] other: Patient Education: [x] Review HEP    [] Progressed/Changed HEP based on:   [] positioning   [] body mechanics   [] transfers   [] heat/ice application    [] other:      Other Objective/Functional Measures:   -demonstrate good form with squats without pain     Pain Level (0-10 scale) post treatment: 0    ASSESSMENT/Changes in Function:   Felicia Watkins is a 41 y.o.  yo female who presents to [facility] diagnosed with tear of lateral meniscus of left knee. Patient is s/p left knee arthroscopic partial lateral menisectomy on 22.  Physical therapy has helped patient achieved or partially met goals. Patient demonstrates ambulation without any assisted device with normal gait. Patient is ambulate up/down stairs with step through. Patient left knee ROM 0-145 degrees, MMT globally 5/5; left hip extension 4+/5, and is able to perform exercises with proper form. She continues to be slightly limited with hip ER and IR, although patients is able to don/off shoes without any difficulty. Patient is discharged due to HEP. []  See Plan of Care  []  See progress note/recertification  [x]  See Discharge Summary         Progress towards goals / Updated goals: To be accomplished in 2 weeks: 1. Patient will report compliance with HEP 1x/day to aid in rehabilitation program.  Status at IE: Provided pt with HEP and pt appeared to understand. Last PN  7/22/22 reports 3-4 x a week compliance  CURRENT 8/2/22: pt reports of complaint with HEP 1x/ day. MET     2. Patient will rate pain on greater than 4/10 so pt can perform ADL's. Status at IE:Pt rates pain _5__/10 max (in the last 48 hrs) __1_/10 min (in the last 48 hrs) _3__/10 currently at rest   Last PN  2/10 at worst int he past week at the end of day 7/22/22  CURRENT 8/2/22: 1/10 pain at worst with ADL's. MET     Long Term Goals: To be accomplished in 8 weeks: 1. Patient will increase limited Bilateral LE strength by at least . 5 grade MMT throughout so pt can perform stairs.   Status at IE:    Left (0-5) Right (0-5) N/T   Knee extension 3- 5 []    Knee flexion 4- 5 []    Ankle Dorsiflexion (L4) 5 5 []    Ankle Plantarflexion (S1)  #heel raises     [x]    Hip flexion 3-  Extensor lag 4 []    Hip abduction 4- 4- []    Hip ER 3 4- []    Hip IR 3 4- []    Hip extension 3- 3+ []    Glute Max 3- 3- []      Last PN  7/22/22    Left (0-5) Right (0-5) N/T   Knee extension 4+ 5 []    Knee flexion 4+ 5 []    Ankle Dorsiflexion (L4) 5 5 []    Hip flexion 4+ 4+ []    Hip abduction 4+ 5 []    Hip ER 4 5 []    Hip IR 3+ 4+ []    Hip extension 4- 5 []    Glute Max 5 5 [] Current 8/2/22: partially met    Left (0-5) Right (0-5) N/T   Knee extension 5 5 []    Knee flexion 5 5 []    Ankle Dorsiflexion (L4) 5 5 []    Hip flexion 5 5 []    Hip abduction 5 5 []    Hip ER 5 5 []    Hip IR 5 5 []    Hip extension 4+ 5 []    Glute Max 5 5 []       2. Patient will report pain no greater than 1-2/10 throughout entire day to aid in completion of ADLs. Status at IE:Pt rates pain _5__/10 max (in the last 48 hrs) __1_/10 min (in the last 48 hrs) _3__/10 currently at rest   Last PN : 2/10 at worst int he past week at the end of day 7/22/22  Current 8/2/22: 1/10 pain at worst with ADL's. MET     3. Patient will 0-140 degrees of right knee ROM so pt can  objects off the ground. Status at IE:  Knee AROM Left Right   Extension Lacking 10 0   Flexion 135 145      Last PN : 7/22/22   Knee AROM Left Right   Extension -5  -2 after interventions 3 hyper   Flexion 145 135        Current 8/2/22:MET  Knee AROM Left Right   Extension 0 3 hyper   Flexion 145 135     4. Patient will improve FOTO score to 71 points to demonstrate improvement in functional status. Status at IE:54  Last PN: 61 Progressing 6/28/22  Current: 7/14/22 - 82 - MET  *FOTO score is an established functional score where 100 = no disability*     5. Pt will have 5-10 deg inc in hip ROM so pt can don/doff shoes  Status at IE:  Hip PROM Left Right   Flexion 80 80   ER 25 32   IR 32 30         Last PN : 7/22/22  Hip PROM Left Right   Flexion 132 123   ER 26 29   IR 30 33      Current 8/2/22: partially met  Hip PROM Left Right   Flexion 132 123   ER 40 29   IR 35 33       New Goal:  Pt will perform 10 free standing squat down to thighs parallel to floor in order to restore prior level of function. LAST PN7/22/22: Pt able to perform TRX squats with good tolerance  CURRENT 8/2/22: Pt demonstrated 2x10 squats with good form and without pain.  MET    PLAN  []  Upgrade activities as tolerated     [x]  Continue plan of care  []  Update interventions per flow sheet       [x]  Discharge due to:HEP  []  Other:_      Ramsey Alcantara PTA 8/2/2022  8:51 AM    Future Appointments   Date Time Provider Amanda Al   8/2/2022 10:45 AM MOLLY Torres [facility]   8/10/2022 12:45 PM Jennyfer Malone, PT [facility]   8/12/2022 12:45 PM JASON Lazar[facility]PKARL [facility]   8/16/2022  2:15 PM JASON Lazar[facility]PKARL [facility]

## 2022-08-02 NOTE — PROGRESS NOTES
[facility] at the [address], [place], [address] [facility]  Phone: [phone]      Fax:  [phone]            Discharge Summary    Patient name: Adrien Fitzpatrick     Start of Care: 2022  Referral source: Jaylene Greenma    : 1975  Medical/Treatment Diagnosis: Left knee pain [M25.562]  Onset Date:2022  Prior Hospitalization: see medical history   Provider#: [id number]  Comorbidities: morbidities: PMHx/Surgical Hx: []DM [] HTN (controlled) [] High cholesterol (controlled) [] Cancer [x]Other fibromyalgia, back pain, neck pain, hip pain  Prior Level of Function:functionally independent, no AD, pain with activity  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 Patient will report compliance with HEP 1x/day to aid in rehabilitation program.  Status at IE: Provided pt with HEP and pt appeared to understand. Last PN  22 reports 3-4 x a week compliance  CURRENT 22: pt reports of complaint with HEP 1x/ day. MET     2. Patient will rate pain on greater than 4/10 so pt can perform ADL's. Status at IE:Pt rates pain _5__/10 max (in the last 48 hrs) __1_/10 min (in the last 48 hrs) _3__/10 currently at rest   Last PN  2/10 at worst int he past week at the end of day 22  CURRENT 22: 1/10 pain at worst with ADL's. MET     Long Term Goals: To be accomplished in 8 weeks: 1. Patient will increase limited Bilateral LE strength by at least . 5 grade MMT throughout so pt can perform stairs.   Status at IE:    Left (0-5) Right (0-5) N/T   Knee extension 3- 5 []    Knee flexion 4- 5 []    Ankle Dorsiflexion (L4) 5 5 []    Ankle Plantarflexion (S1)  #heel raises     [x]    Hip flexion 3-  Extensor lag 4 []    Hip abduction 4- 4- []    Hip ER 3 4- []    Hip IR 3 4- []    Hip extension 3- 3+ []    Glute Max 3- 3- []       Last PN  7/22/22    Left (0-5) Right (0-5) N/T   Knee extension 4+ 5 []    Knee flexion 4+ 5 []    Ankle Dorsiflexion (L4) 5 5 []    Hip flexion 4+ 4+ []    Hip abduction 4+ 5 []    Hip ER 4 5 []    Hip IR 3+ 4+ []    Hip extension 4- 5 []    Glute Max 5 5 []       Current 8/2/22: partially met    Left (0-5) Right (0-5) N/T   Knee extension 5 5 []    Knee flexion 5 5 []    Ankle Dorsiflexion (L4) 5 5 []    Hip flexion 5 5 []    Hip abduction 5 5 []    Hip ER 5 5 []    Hip IR 5 5 []    Hip extension 4+ 5 []    Glute Max 5 5 []       2. Patient will report pain no greater than 1-2/10 throughout entire day to aid in completion of ADLs. Status at IE:Pt rates pain _5__/10 max (in the last 48 hrs) __1_/10 min (in the last 48 hrs) _3__/10 currently at rest   Last PN : 2/10 at worst int he past week at the end of day 7/22/22  Current 8/2/22: 1/10 pain at worst with ADL's. MET     3. Patient will 0-140 degrees of right knee ROM so pt can  objects off the ground. Status at IE:  Knee AROM Left Right   Extension Lacking 10 0   Flexion 135 145      Last PN : 7/22/22   Knee AROM Left Right   Extension -5  -2 after interventions 3 hyper   Flexion 145 135         Current 8/2/22:MET  Knee AROM Left Right   Extension 0 3 hyper   Flexion 145 135      4. Patient will improve FOTO score to 71 points to demonstrate improvement in functional status. Status at IE:54  Last PN: 61 Progressing 6/28/22  Current: 7/14/22 - 82 - MET  *FOTO score is an established functional score where 100 = no disability*     5.  Pt will have 5-10 deg inc in hip ROM so pt can don/doff shoes  Status at IE:  Hip PROM Left Right   Flexion 80 80   ER 25 32   IR 32 30         Last PN : 7/22/22  Hip PROM Left Right   Flexion 132 123   ER 26 29   IR 30 33      Current 8/2/22: partially met  Hip PROM Left Right   Flexion 132 123   ER 40 29   IR 35 33         New Goal:  Pt will perform 10 free standing squat down to thighs parallel to floor in order to restore prior level of function. LAST PN7/22/22: Pt able to perform TRX squats with good tolerance  CURRENT 8/2/22: Pt demonstrated 2x10 squats with good form and without pain. MET      Assessment/ Summary of Care: Patrice Allen is a 41 y.o.  yo female who presents to [facility] diagnosed with tear of lateral meniscus of left knee. Patient is s/p left knee arthroscopic partial lateral menisectomy on 5/2/22. Physical therapy has helped patient achieved or partially met goals. Patient demonstrates ambulation without any assisted device with normal gait. Patient is ambulate up/down stairs with step through. Patient left knee ROM 0-145 degrees, MMT globally 5/5; left hip extension 4+/5, and is able to perform exercises with proper form. She continues to be slightly limited with hip ER and IR, although patients is able to don/off shoes without any difficulty. Patient is discharged due to HEP.     RECOMMENDATIONS:  [x]Discontinue therapy: [x]Patient has reached or is progressing toward set goals      []Patient is non-compliant or has abdicated      []Due to lack of appreciable progress towards set goals    Ángel Coughlin PTA 8/2/2022 10:27 AM

## 2022-08-10 ENCOUNTER — APPOINTMENT (OUTPATIENT)
Dept: PHYSICAL THERAPY | Age: 47
End: 2022-08-10
Payer: OTHER GOVERNMENT

## 2022-08-12 ENCOUNTER — APPOINTMENT (OUTPATIENT)
Dept: PHYSICAL THERAPY | Age: 47
End: 2022-08-12
Payer: OTHER GOVERNMENT

## 2022-08-16 ENCOUNTER — APPOINTMENT (OUTPATIENT)
Dept: PHYSICAL THERAPY | Age: 47
End: 2022-08-16
Payer: OTHER GOVERNMENT

## 2024-10-13 NOTE — PROGRESS NOTES
0631: Writer provided pt with juice and pedialyte for PO challenge. Pt's father verbalized understanding regarding plan of care.  0657: Pt able to tolerate PO intake. However, increased oral temp and continued tachycardia. MD updated.   [facility] at the [address], [place], [address] [facility]  Phone: [phone]      Fax:  [phone]    Progress Note  Patient name: Dottie Boone Start of Care: 2022   Referral source: George Alegria : 1975   Medical/Treatment Diagnosis: Left knee pain [M25.562] Onset Date:DOS: 22      Prior Hospitalization: see medical history Provider#: [id number]   Medications: Verified on Patient Summary List     Comorbidities:PMHx/Surgical Hx: []DM [] HTN (controlled) [] High cholesterol (controlled) [] Cancer [x]Other fibromyalgia, back pain, neck pain, hip pain  Prior Level of Function:  functionally independent, no AD, pain with activity  Social/Recreation/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 Patient will report compliance with HEP 1x/day to aid in rehabilitation program.  Status at IE: Provided pt with HEP and pt appeared to understand. Last PN 22: pt reports compliant with HEP daily. IN PROGRESS   Current: 22 reports 3-4 x a week compliance     2. Patient will rate pain on greater than 4/10 so pt can perform ADL's. Status at IE:Pt rates pain _5__/10 max (in the last 48 hrs) __1_/10 min (in the last 48 hrs) _3__/10 currently at rest   Last PN: 5/10 max pain but not happening as often progressing 22  Current: 2/10 at worst int he past week at the end of day 22     Long Term Goals: To be accomplished in 8 weeks: 1. Patient will increase limited Bilateral LE strength by at least . 5 grade MMT throughout so pt can perform stairs.   Status at IE:    Left (0-5) Right (0-5) N/T   Knee extension 3- 5 []    Knee flexion 4- 5 []    Ankle Dorsiflexion (L4) 5 5 []    Ankle Plantarflexion (S1)  #heel raises     [x]    Hip flexion 3-  Extensor lag 4 []    Hip abduction 4- 4- []    Hip ER 3 4- []    Hip IR 3 4- []    Hip extension 3- 3+ []    Glute Max 3- 3- []       Last PN 6/28/22: Performed on 6/22/22 as follows IN PROGRESS       Left (0-5) Right (0-5) N/T   Knee extension 4-p! 5 []    Knee flexion 4+ 5 []    Ankle Dorsiflexion (L4) 5 5 []    Hip flexion 4-  Extensor lag 4+ []    Hip abduction 4+ 5 []    Hip ER 3p! 5 []    Hip IR 3 4+ []    Hip extension 4- 5 []    Glute Max 5 5 []    Current: 7/22/22    Left (0-5) Right (0-5) N/T   Knee extension 4+ 5 []    Knee flexion 4+ 5 []    Ankle Dorsiflexion (L4) 5 5 []    Hip flexion 4+ 4+ []    Hip abduction 4+ 5 []    Hip ER 4 5 []    Hip IR 3+ 4+ []    Hip extension 4- 5 []    Glute Max 5 5 []       2. Patient will report pain no greater than 1-2/10 throughout entire day to aid in completion of ADLs. Status at IE:Pt rates pain _5__/10 max (in the last 48 hrs) __1_/10 min (in the last 48 hrs) _3__/10 currently at rest   Last PN:  5/10 max pain but not happening as often progressing 6/28/22  Current: 2/10 at worst int he past week at the end of day 7/22/22     3. Patient will 0-140 degrees of right knee ROM so pt can  objects off the ground. Status at IE:  Knee AROM Left Right   Extension Lacking 10 0   Flexion 135 145      Last PNt: 6/28/22 progressing  Knee AROM Left Right   Extension -7  -2 after interventions 3 hyper   Flexion 135 135    Current: 7/22/22   Knee AROM Left Right   Extension -5  -2 after interventions 3 hyper   Flexion 145 135      4. Patient will improve FOTO score to 71 points to demonstrate improvement in functional status. Status at IE:54  Last PN: 61 Progressing 6/28/22  Current: 7/14/22 - 82 - MET  *FOTO score is an established functional score where 100 = no disability*     5.  Pt will have 5-10 deg inc in hip ROM so pt can don/doff shoes  Status at IE:  Hip PROM Left Right   Flexion 80 80   ER 25 32   IR 32 30      Last PN: 6/28/22 progressing  Hip PROM Left Right   Flexion 130 123   ER 26 29   IR 27 33      Current: 7/22/22  Hip PROM Left Right   Flexion 132 123   ER 26 29   IR 30 33      New Goal:  Pt will perform 10 free standing squat down to thighs parallel to floor in order to restore prior level of function. 7/22/22: Pt able to perform TRX squats with good tolerance. Key Functional Changes:  Nimisha Smith is a 41 y.o.  yo female who presented to [facility] diagnosed with tear of lateral meniscus of left knee. Patient is s/p left knee arthroscopic partial lateral menisectomy on 5/2/22. Pt has attended 17 sessions and has improved both knee and hip ROM, strength and balance. Pt is progressing nicely towards goals. Pt reports 2/10 pain at worst within last week. Pt still with residual hip and knee weakness. Pt continues to struggle with negotiating stairs and deep squatting. Pt will continue to benefit from skilled PT services to modify and progress therapeutic interventions, address functional mobility deficits, address ROM deficits, address strength deficits, analyze and address soft tissue restrictions, analyze and cue movement patterns, analyze and modify body mechanics/ergonomics, assess and modify postural abnormalities, and address imbalance/dizziness to attain remaining goals.     Updated Goals: to be achieved in 4 weeks:   See above    ASSESSMENT/RECOMMENDATIONS:  [x]Continue therapy per initial plan/protocol at a frequency of  2 x per week for 4 weeks  []Continue therapy with the following recommended changes:_____________________      _____________________________________________________________________  []Discontinue therapy progressing towards or have reached established goals  []Discontinue therapy due to lack of appreciable progress towards goals  []Discontinue therapy due to lack of attendance or compliance  []Await Physician's recommendations/decisions regarding therapy  []Other:________________________________________________________________    Thank you for this referral.   Cher Duncan, PT 7/22/2022 11:23 AM  NOTE TO PHYSICIAN:  PLEASE COMPLETE THE ORDERS BELOW AND   FAX TO [facility]: [phone]  If you are unable to process this request in 24 hours please contact our office: [phone]        []  I have read the above report and request that my patient continue as recommended. []  I have read the above report and request that my patient continue therapy with the following changes/special instructions:________________________________________  []I have read the above report and request that my patient be discharged from therapy.     [de-identified] Signature:____________Date:_________TIME:________                                      JAVIER Hoffmann      ** Signature, Date and Time must be completed for valid certification **